# Patient Record
Sex: MALE | Race: WHITE | NOT HISPANIC OR LATINO | Employment: OTHER | URBAN - METROPOLITAN AREA
[De-identification: names, ages, dates, MRNs, and addresses within clinical notes are randomized per-mention and may not be internally consistent; named-entity substitution may affect disease eponyms.]

---

## 2023-04-27 LAB
ANION GAP IN SER/PLAS: 12 MMOL/L (ref 10–20)
CALCIUM (MG/DL) IN SER/PLAS: 9 MG/DL (ref 8.6–10.3)
CARBON DIOXIDE, TOTAL (MMOL/L) IN SER/PLAS: 27 MMOL/L (ref 21–32)
CHLORIDE (MMOL/L) IN SER/PLAS: 104 MMOL/L (ref 98–107)
CREATININE (MG/DL) IN SER/PLAS: 0.9 MG/DL (ref 0.5–1.3)
GFR MALE: >90 ML/MIN/1.73M2
GLUCOSE (MG/DL) IN SER/PLAS: 110 MG/DL (ref 74–99)
POTASSIUM (MMOL/L) IN SER/PLAS: 3.9 MMOL/L (ref 3.5–5.3)
SODIUM (MMOL/L) IN SER/PLAS: 139 MMOL/L (ref 136–145)
UREA NITROGEN (MG/DL) IN SER/PLAS: 19 MG/DL (ref 6–23)

## 2023-10-02 ENCOUNTER — TELEPHONE (OUTPATIENT)
Dept: PRIMARY CARE | Facility: CLINIC | Age: 68
End: 2023-10-02
Payer: MEDICARE

## 2023-10-02 DIAGNOSIS — Z12.11 ENCOUNTER FOR SCREENING FOR MALIGNANT NEOPLASM OF COLON: Primary | ICD-10-CM

## 2023-10-17 ENCOUNTER — OFFICE VISIT (OUTPATIENT)
Dept: PRIMARY CARE | Facility: CLINIC | Age: 68
End: 2023-10-17
Payer: MEDICARE

## 2023-10-17 VITALS
OXYGEN SATURATION: 95 % | TEMPERATURE: 98 F | HEART RATE: 66 BPM | BODY MASS INDEX: 32.26 KG/M2 | SYSTOLIC BLOOD PRESSURE: 140 MMHG | HEIGHT: 72 IN | WEIGHT: 238.2 LBS | DIASTOLIC BLOOD PRESSURE: 82 MMHG

## 2023-10-17 DIAGNOSIS — N40.0 BENIGN PROSTATIC HYPERPLASIA WITHOUT LOWER URINARY TRACT SYMPTOMS: ICD-10-CM

## 2023-10-17 DIAGNOSIS — Z00.00 MEDICARE ANNUAL WELLNESS VISIT, SUBSEQUENT: Primary | ICD-10-CM

## 2023-10-17 DIAGNOSIS — E78.5 HYPERLIPIDEMIA, UNSPECIFIED HYPERLIPIDEMIA TYPE: ICD-10-CM

## 2023-10-17 DIAGNOSIS — N52.9 ERECTILE DYSFUNCTION, UNSPECIFIED ERECTILE DYSFUNCTION TYPE: ICD-10-CM

## 2023-10-17 LAB
ALBUMIN SERPL BCP-MCNC: 4.3 G/DL (ref 3.4–5)
ALP SERPL-CCNC: 72 U/L (ref 33–136)
ALT SERPL W P-5'-P-CCNC: 22 U/L (ref 10–52)
ANION GAP SERPL CALC-SCNC: 15 MMOL/L (ref 10–20)
AST SERPL W P-5'-P-CCNC: 18 U/L (ref 9–39)
BASOPHILS # BLD AUTO: 0.04 X10*3/UL (ref 0–0.1)
BASOPHILS NFR BLD AUTO: 0.5 %
BILIRUB SERPL-MCNC: 0.6 MG/DL (ref 0–1.2)
BUN SERPL-MCNC: 19 MG/DL (ref 6–23)
CALCIUM SERPL-MCNC: 9.1 MG/DL (ref 8.6–10.6)
CHLORIDE SERPL-SCNC: 104 MMOL/L (ref 98–107)
CHOLEST SERPL-MCNC: 136 MG/DL (ref 0–199)
CHOLESTEROL/HDL RATIO: 4.6
CO2 SERPL-SCNC: 26 MMOL/L (ref 21–32)
CREAT SERPL-MCNC: 0.91 MG/DL (ref 0.5–1.3)
EOSINOPHIL # BLD AUTO: 0.14 X10*3/UL (ref 0–0.7)
EOSINOPHIL NFR BLD AUTO: 1.8 %
ERYTHROCYTE [DISTWIDTH] IN BLOOD BY AUTOMATED COUNT: 12.6 % (ref 11.5–14.5)
GFR SERPL CREATININE-BSD FRML MDRD: >90 ML/MIN/1.73M*2
GLUCOSE SERPL-MCNC: 102 MG/DL (ref 74–99)
HCT VFR BLD AUTO: 47.3 % (ref 41–52)
HCV AB SER QL: NONREACTIVE
HDLC SERPL-MCNC: 29.5 MG/DL
HGB BLD-MCNC: 16 G/DL (ref 13.5–17.5)
IMM GRANULOCYTES # BLD AUTO: 0.05 X10*3/UL (ref 0–0.7)
IMM GRANULOCYTES NFR BLD AUTO: 0.6 % (ref 0–0.9)
LDLC SERPL CALC-MCNC: 57 MG/DL
LYMPHOCYTES # BLD AUTO: 1.88 X10*3/UL (ref 1.2–4.8)
LYMPHOCYTES NFR BLD AUTO: 23.7 %
MCH RBC QN AUTO: 32.6 PG (ref 26–34)
MCHC RBC AUTO-ENTMCNC: 33.8 G/DL (ref 32–36)
MCV RBC AUTO: 96 FL (ref 80–100)
MONOCYTES # BLD AUTO: 0.76 X10*3/UL (ref 0.1–1)
MONOCYTES NFR BLD AUTO: 9.6 %
NEUTROPHILS # BLD AUTO: 5.05 X10*3/UL (ref 1.2–7.7)
NEUTROPHILS NFR BLD AUTO: 63.8 %
NON HDL CHOLESTEROL: 107 MG/DL (ref 0–149)
NRBC BLD-RTO: 0 /100 WBCS (ref 0–0)
PLATELET # BLD AUTO: 176 X10*3/UL (ref 150–450)
PMV BLD AUTO: 10.9 FL (ref 7.5–11.5)
POTASSIUM SERPL-SCNC: 3.6 MMOL/L (ref 3.5–5.3)
PROT SERPL-MCNC: 7.7 G/DL (ref 6.4–8.2)
PSA SERPL-MCNC: 0.51 NG/ML
RBC # BLD AUTO: 4.91 X10*6/UL (ref 4.5–5.9)
SODIUM SERPL-SCNC: 141 MMOL/L (ref 136–145)
TRIGL SERPL-MCNC: 250 MG/DL (ref 0–149)
VLDL: 50 MG/DL (ref 0–40)
WBC # BLD AUTO: 7.9 X10*3/UL (ref 4.4–11.3)

## 2023-10-17 PROCEDURE — 99497 ADVNCD CARE PLAN 30 MIN: CPT | Performed by: FAMILY MEDICINE

## 2023-10-17 PROCEDURE — 80053 COMPREHEN METABOLIC PANEL: CPT

## 2023-10-17 PROCEDURE — 3008F BODY MASS INDEX DOCD: CPT | Performed by: FAMILY MEDICINE

## 2023-10-17 PROCEDURE — 36415 COLL VENOUS BLD VENIPUNCTURE: CPT

## 2023-10-17 PROCEDURE — 1159F MED LIST DOCD IN RCRD: CPT | Performed by: FAMILY MEDICINE

## 2023-10-17 PROCEDURE — 1036F TOBACCO NON-USER: CPT | Performed by: FAMILY MEDICINE

## 2023-10-17 PROCEDURE — 99214 OFFICE O/P EST MOD 30 MIN: CPT | Performed by: FAMILY MEDICINE

## 2023-10-17 PROCEDURE — 80061 LIPID PANEL: CPT

## 2023-10-17 PROCEDURE — 84153 ASSAY OF PSA TOTAL: CPT

## 2023-10-17 PROCEDURE — 85025 COMPLETE CBC W/AUTO DIFF WBC: CPT

## 2023-10-17 PROCEDURE — 1170F FXNL STATUS ASSESSED: CPT | Performed by: FAMILY MEDICINE

## 2023-10-17 PROCEDURE — 1126F AMNT PAIN NOTED NONE PRSNT: CPT | Performed by: FAMILY MEDICINE

## 2023-10-17 PROCEDURE — 1160F RVW MEDS BY RX/DR IN RCRD: CPT | Performed by: FAMILY MEDICINE

## 2023-10-17 PROCEDURE — 86803 HEPATITIS C AB TEST: CPT

## 2023-10-17 PROCEDURE — 1158F ADVNC CARE PLAN TLK DOCD: CPT | Performed by: FAMILY MEDICINE

## 2023-10-17 PROCEDURE — G0439 PPPS, SUBSEQ VISIT: HCPCS | Performed by: FAMILY MEDICINE

## 2023-10-17 RX ORDER — DOCUSATE SODIUM 100 MG/1
CAPSULE, LIQUID FILLED ORAL 2 TIMES DAILY PRN
COMMUNITY
Start: 2021-09-09

## 2023-10-17 RX ORDER — HYDROCODONE BITARTRATE AND ACETAMINOPHEN 5; 325 MG/1; MG/1
1 TABLET ORAL EVERY 6 HOURS PRN
COMMUNITY
Start: 2022-11-10 | End: 2023-10-17 | Stop reason: ALTCHOICE

## 2023-10-17 RX ORDER — HYDROCHLOROTHIAZIDE 25 MG/1
25 TABLET ORAL DAILY
COMMUNITY
End: 2024-02-22 | Stop reason: SDUPTHER

## 2023-10-17 RX ORDER — IRON POLYSACCHARIDE COMPLEX 150 MG
CAPSULE ORAL
COMMUNITY
Start: 2021-09-09 | End: 2023-10-17 | Stop reason: ALTCHOICE

## 2023-10-17 RX ORDER — AMOXICILLIN 500 MG/1
CAPSULE ORAL
COMMUNITY
Start: 2022-11-28 | End: 2023-10-17 | Stop reason: ALTCHOICE

## 2023-10-17 RX ORDER — FLUTICASONE PROPIONATE 50 MCG
2 SPRAY, SUSPENSION (ML) NASAL DAILY
COMMUNITY
Start: 2023-05-13

## 2023-10-17 RX ORDER — IBUPROFEN 800 MG/1
TABLET ORAL
COMMUNITY
Start: 2022-12-06 | End: 2023-10-17 | Stop reason: ALTCHOICE

## 2023-10-17 RX ORDER — METOPROLOL TARTRATE 50 MG/1
TABLET ORAL
COMMUNITY
End: 2023-11-05

## 2023-10-17 RX ORDER — OXCARBAZEPINE 600 MG/1
600 TABLET, FILM COATED ORAL 2 TIMES DAILY
COMMUNITY
End: 2023-11-20 | Stop reason: SDUPTHER

## 2023-10-17 RX ORDER — ACETAMINOPHEN 325 MG/1
TABLET ORAL EVERY 6 HOURS PRN
COMMUNITY
Start: 2021-08-28

## 2023-10-17 RX ORDER — AMOXICILLIN 875 MG/1
1 TABLET, FILM COATED ORAL EVERY 12 HOURS
COMMUNITY
Start: 2023-05-13 | End: 2023-10-17 | Stop reason: ALTCHOICE

## 2023-10-17 RX ORDER — CHLORHEXIDINE GLUCONATE ORAL RINSE 1.2 MG/ML
SOLUTION DENTAL
COMMUNITY
Start: 2022-11-10

## 2023-10-17 RX ORDER — SILDENAFIL 100 MG/1
100 TABLET, FILM COATED ORAL DAILY PRN
Qty: 10 TABLET | Refills: 3 | Status: SHIPPED | OUTPATIENT
Start: 2023-10-17

## 2023-10-17 RX ORDER — POLYETHYLENE GLYCOL 3350 17 G/17G
17 POWDER, FOR SOLUTION ORAL
COMMUNITY
Start: 2021-08-25

## 2023-10-17 RX ORDER — BENZONATATE 100 MG/1
1 CAPSULE ORAL 3 TIMES DAILY PRN
COMMUNITY
Start: 2023-05-13 | End: 2023-10-17 | Stop reason: ALTCHOICE

## 2023-10-17 RX ORDER — SILDENAFIL 100 MG/1
1 TABLET, FILM COATED ORAL DAILY PRN
COMMUNITY
Start: 2020-02-21 | End: 2023-10-17 | Stop reason: SDUPTHER

## 2023-10-17 RX ORDER — ASPIRIN 81 MG/1
1 TABLET ORAL DAILY
COMMUNITY
Start: 2021-08-25

## 2023-10-17 RX ORDER — PROBENECID 500 MG/1
TABLET, FILM COATED ORAL 2 TIMES DAILY
COMMUNITY
Start: 2021-09-04 | End: 2023-10-17 | Stop reason: ALTCHOICE

## 2023-10-17 RX ORDER — ATORVASTATIN CALCIUM 10 MG/1
10 TABLET, FILM COATED ORAL DAILY
COMMUNITY
End: 2023-12-18 | Stop reason: SDUPTHER

## 2023-10-17 SDOH — ECONOMIC STABILITY: TRANSPORTATION INSECURITY
IN THE PAST 12 MONTHS, HAS LACK OF TRANSPORTATION KEPT YOU FROM MEETINGS, WORK, OR FROM GETTING THINGS NEEDED FOR DAILY LIVING?: NO

## 2023-10-17 SDOH — ECONOMIC STABILITY: HOUSING INSECURITY
IN THE LAST 12 MONTHS, WAS THERE A TIME WHEN YOU DID NOT HAVE A STEADY PLACE TO SLEEP OR SLEPT IN A SHELTER (INCLUDING NOW)?: NO

## 2023-10-17 SDOH — ECONOMIC STABILITY: FOOD INSECURITY: WITHIN THE PAST 12 MONTHS, YOU WORRIED THAT YOUR FOOD WOULD RUN OUT BEFORE YOU GOT MONEY TO BUY MORE.: NEVER TRUE

## 2023-10-17 SDOH — ECONOMIC STABILITY: TRANSPORTATION INSECURITY
IN THE PAST 12 MONTHS, HAS THE LACK OF TRANSPORTATION KEPT YOU FROM MEDICAL APPOINTMENTS OR FROM GETTING MEDICATIONS?: NO

## 2023-10-17 SDOH — ECONOMIC STABILITY: FOOD INSECURITY: WITHIN THE PAST 12 MONTHS, THE FOOD YOU BOUGHT JUST DIDN'T LAST AND YOU DIDN'T HAVE MONEY TO GET MORE.: NEVER TRUE

## 2023-10-17 SDOH — ECONOMIC STABILITY: INCOME INSECURITY: IN THE LAST 12 MONTHS, WAS THERE A TIME WHEN YOU WERE NOT ABLE TO PAY THE MORTGAGE OR RENT ON TIME?: NO

## 2023-10-17 ASSESSMENT — ENCOUNTER SYMPTOMS
LOSS OF SENSATION IN FEET: 0
RESPIRATORY NEGATIVE: 1
CONSTITUTIONAL NEGATIVE: 1
ENDOCRINE NEGATIVE: 1
PSYCHIATRIC NEGATIVE: 1
SEIZURES: 1
OCCASIONAL FEELINGS OF UNSTEADINESS: 0
DEPRESSION: 0

## 2023-10-17 ASSESSMENT — ACTIVITIES OF DAILY LIVING (ADL)
STIL DRIVING: YES
GROOMING: INDEPENDENT
NEEDS ASSISTANCE WITH FOOD: INDEPENDENT
JUDGMENT_ADEQUATE_SAFELY_COMPLETE_DAILY_ACTIVITIES: YES
TOILETING: INDEPENDENT
USING TRANSPORTATION: INDEPENDENT
GROCERY SHOPPING: INDEPENDENT
BATHING: INDEPENDENT
PREPARING MEALS: INDEPENDENT
PATIENT'S MEMORY ADEQUATE TO SAFELY COMPLETE DAILY ACTIVITIES?: YES
FEEDING YOURSELF: INDEPENDENT
DOING HOUSEWORK: INDEPENDENT
DRESSING YOURSELF: INDEPENDENT
MANAGING FINANCES: INDEPENDENT
TAKING MEDICATION: INDEPENDENT
BATHING: INDEPENDENT
ADEQUATE_TO_COMPLETE_ADL: YES
DRESSING: INDEPENDENT
TOILETING: INDEPENDENT
USING TELEPHONE: INDEPENDENT
WALKS IN HOME: INDEPENDENT
EATING: INDEPENDENT
FEEDING: INDEPENDENT
JUDGMENT_ADEQUATE_SAFELY_COMPLETE_DAILY_ACTIVITIES: YES
ADEQUATE_TO_COMPLETE_ADL: YES

## 2023-10-17 ASSESSMENT — GERIATRIC MINI NUTRITIONAL ASSESSMENT (MNA)
C GENERAL MOBILITY: GOES OUT
E NEUROPSYCHOLOGICAL PROBLEMS: NO PSYCHOLOGICAL PROBLEMS
D HAS SUFFERED PSYCHOLOGICAL STRESS OR ACUTE DISEASE IN THE PAST 3 MONTHS?: NO
B WEIGHT LOSS DURING THE LAST 3 MONTHS: NO WEIGHT LOSS
A HAS FOOD INTAKE DECLINED OVER THE PAST 3 MONTHS DUE TO LOSS OF APPETITE, DIGESTIVE PROBLEMS, CHEWING OR SWALLOWING DIFFICULTIES?: NO DECREASE IN FOOD INTAKE

## 2023-10-17 ASSESSMENT — ANXIETY QUESTIONNAIRES
2. NOT BEING ABLE TO STOP OR CONTROL WORRYING: NOT AT ALL
1. FEELING NERVOUS, ANXIOUS, OR ON EDGE: NOT AT ALL
4. TROUBLE RELAXING: NOT AT ALL
7. FEELING AFRAID AS IF SOMETHING AWFUL MIGHT HAPPEN: NOT AT ALL
3. WORRYING TOO MUCH ABOUT DIFFERENT THINGS: NOT AT ALL
5. BEING SO RESTLESS THAT IT IS HARD TO SIT STILL: NOT AT ALL
GAD7 TOTAL SCORE: 0
6. BECOMING EASILY ANNOYED OR IRRITABLE: NOT AT ALL

## 2023-10-17 ASSESSMENT — SOCIAL DETERMINANTS OF HEALTH (SDOH)
IN THE PAST 12 MONTHS, HAS THE ELECTRIC, GAS, OIL, OR WATER COMPANY THREATENED TO SHUT OFF SERVICE IN YOUR HOME?: NO
WITHIN THE LAST YEAR, HAVE TO BEEN RAPED OR FORCED TO HAVE ANY KIND OF SEXUAL ACTIVITY BY YOUR PARTNER OR EX-PARTNER?: NO
WITHIN THE LAST YEAR, HAVE YOU BEEN KICKED, HIT, SLAPPED, OR OTHERWISE PHYSICALLY HURT BY YOUR PARTNER OR EX-PARTNER?: NO
WITHIN THE LAST YEAR, HAVE YOU BEEN HUMILIATED OR EMOTIONALLY ABUSED IN OTHER WAYS BY YOUR PARTNER OR EX-PARTNER?: NO
WITHIN THE LAST YEAR, HAVE YOU BEEN AFRAID OF YOUR PARTNER OR EX-PARTNER?: NO
HOW HARD IS IT FOR YOU TO PAY FOR THE VERY BASICS LIKE FOOD, HOUSING, MEDICAL CARE, AND HEATING?: NOT HARD AT ALL

## 2023-10-17 ASSESSMENT — PATIENT HEALTH QUESTIONNAIRE - PHQ9
SUM OF ALL RESPONSES TO PHQ9 QUESTIONS 1 & 2: 0
1. LITTLE INTEREST OR PLEASURE IN DOING THINGS: NOT AT ALL
2. FEELING DOWN, DEPRESSED OR HOPELESS: NOT AT ALL

## 2023-10-17 ASSESSMENT — PAIN SCALES - GENERAL: PAINLEVEL: 0-NO PAIN

## 2023-10-17 ASSESSMENT — LIFESTYLE VARIABLES
SKIP TO QUESTIONS 9-10: 1
HOW OFTEN DO YOU HAVE SIX OR MORE DRINKS ON ONE OCCASION: NEVER
AUDIT-C TOTAL SCORE: 0
HOW MANY STANDARD DRINKS CONTAINING ALCOHOL DO YOU HAVE ON A TYPICAL DAY: PATIENT DOES NOT DRINK
HOW OFTEN DO YOU HAVE A DRINK CONTAINING ALCOHOL: NEVER

## 2023-10-17 NOTE — PROGRESS NOTES
Subjective   Patient ID: Jean Claude Viramontes is a 67 y.o. male who presents for Annual Exam (Assessment annual medicare wellness).    HPI     Review of Systems   Constitutional: Negative.    HENT: Negative.     Respiratory: Negative.     Cardiovascular:         Dr Conley CABG 2 yrs ago   Gastrointestinal:         Having visit Dr Redd to schedule colonoscopy   Endocrine: Negative.    Genitourinary: Negative.    Neurological:  Positive for seizures.   Psychiatric/Behavioral: Negative.         Objective   /82 (BP Location: Right arm)   Pulse 66   Temp 36.7 °C (98 °F) (Temporal)   Ht 1.829 m (6')   Wt 108 kg (238 lb 3.2 oz)   SpO2 95%   BMI 32.31 kg/m²     Physical Exam  Vitals and nursing note reviewed.   HENT:      Right Ear: Tympanic membrane normal.      Left Ear: Tympanic membrane normal.      Mouth/Throat:      Pharynx: Oropharynx is clear.   Cardiovascular:      Rate and Rhythm: Normal rate and regular rhythm.      Pulses: Normal pulses.      Heart sounds: Murmur heard.      Systolic murmur is present with a grade of 2/6.   Pulmonary:      Breath sounds: Normal breath sounds.   Abdominal:      Palpations: Abdomen is soft.   Musculoskeletal:         General: Normal range of motion.   Neurological:      General: No focal deficit present.      Mental Status: He is alert and oriented to person, place, and time.   Psychiatric:         Mood and Affect: Mood normal.         Behavior: Behavior normal.         Assessment/Plan patient seen here for an annual Medicare wellness exam.  We reviewed his questionnaire he is agreeable to his responses.  We did discuss advanced directives.  He has no significant difficulty with depression or anxiety.  He continues to follow with cardiology.  He has had no seizures in 25 years.  He does continue to take his medications.  He has upcoming appoint with gastroenterology to arrange a colonoscopy.  I will see him back in a year  Problem List Items Addressed This Visit     None  Visit Diagnoses         Codes    Medicare annual wellness visit, subsequent    -  Primary Z00.00    Relevant Orders    Hepatitis C antibody    Benign prostatic hyperplasia without lower urinary tract symptoms     N40.0    Relevant Orders    Prostate Specific Antigen    BMI 32.0-32.9,adult     Z68.32    Erectile dysfunction, unspecified erectile dysfunction type     N52.9    Relevant Medications    sildenafil (Viagra) 100 mg tablet    Hyperlipidemia, unspecified hyperlipidemia type     E78.5    Relevant Orders    Lipid Panel    CBC and Auto Differential    Comprehensive Metabolic Panel

## 2023-10-17 NOTE — ACP (ADVANCE CARE PLANNING)
Confirming Previous Code Status:   Advance Care Planning Note     Discussion Date: 10/17/23   Discussion Participants: patient    The patient wishes to discuss Advance Care Planning today and the following is a brief summary of our discussion.     Patient has capacity to make their own medical decisions: Yes  Health Care Agent/Surrogate Decision Maker documented in chart: Yes    Documents on file and valid:  Advance Directive/Living Will: Yes   Health Care Power of : Yes  Other: none    Communication of Medical Status/Prognosis:   yes     Communication of Treatment Goals/Options:   no     Treatment Decisions  Goals of Care: survival is paramount regardless of prognosis, treatment outcome, or burden   yes  Follow Up Plan  no  Team Members  myself  Time Statement: Total face to face time spent on advance care planning was 16 minutes with 16 minutes spent in counseling, including the explanation.    Mingo Crook DO  10/17/2023 4:46 PM

## 2023-11-03 DIAGNOSIS — I48.91 ATRIAL FIBRILLATION, UNSPECIFIED TYPE (MULTI): Primary | ICD-10-CM

## 2023-11-05 RX ORDER — METOPROLOL TARTRATE 50 MG/1
TABLET ORAL
Qty: 120 TABLET | Refills: 0 | Status: SHIPPED | OUTPATIENT
Start: 2023-11-05 | End: 2024-01-02 | Stop reason: SDUPTHER

## 2023-11-20 DIAGNOSIS — R56.9 SEIZURE (MULTI): Primary | ICD-10-CM

## 2023-11-20 RX ORDER — OXCARBAZEPINE 600 MG/1
600 TABLET, FILM COATED ORAL 2 TIMES DAILY
Qty: 180 TABLET | Refills: 1 | Status: SHIPPED | OUTPATIENT
Start: 2023-11-20 | End: 2024-05-22

## 2023-11-21 ENCOUNTER — APPOINTMENT (OUTPATIENT)
Dept: GASTROENTEROLOGY | Facility: CLINIC | Age: 68
End: 2023-11-21
Payer: MEDICARE

## 2023-11-27 ENCOUNTER — OFFICE VISIT (OUTPATIENT)
Dept: GASTROENTEROLOGY | Facility: CLINIC | Age: 68
End: 2023-11-27
Payer: MEDICARE

## 2023-11-27 ENCOUNTER — TELEPHONE (OUTPATIENT)
Dept: CARDIOLOGY | Facility: CLINIC | Age: 68
End: 2023-11-27

## 2023-11-27 ENCOUNTER — TELEPHONE (OUTPATIENT)
Dept: GASTROENTEROLOGY | Facility: CLINIC | Age: 68
End: 2023-11-27

## 2023-11-27 VITALS
HEART RATE: 58 BPM | HEIGHT: 72 IN | WEIGHT: 242 LBS | DIASTOLIC BLOOD PRESSURE: 85 MMHG | SYSTOLIC BLOOD PRESSURE: 169 MMHG | BODY MASS INDEX: 32.78 KG/M2

## 2023-11-27 DIAGNOSIS — Z12.11 COLON CANCER SCREENING: Primary | ICD-10-CM

## 2023-11-27 DIAGNOSIS — K92.1 HEMATOCHEZIA: ICD-10-CM

## 2023-11-27 DIAGNOSIS — Z95.3 S/P AORTIC VALVE REPLACEMENT WITH BIOPROSTHETIC VALVE: ICD-10-CM

## 2023-11-27 PROCEDURE — 46600 DIAGNOSTIC ANOSCOPY SPX: CPT | Performed by: STUDENT IN AN ORGANIZED HEALTH CARE EDUCATION/TRAINING PROGRAM

## 2023-11-27 PROCEDURE — 1159F MED LIST DOCD IN RCRD: CPT | Performed by: STUDENT IN AN ORGANIZED HEALTH CARE EDUCATION/TRAINING PROGRAM

## 2023-11-27 PROCEDURE — 3008F BODY MASS INDEX DOCD: CPT | Performed by: STUDENT IN AN ORGANIZED HEALTH CARE EDUCATION/TRAINING PROGRAM

## 2023-11-27 PROCEDURE — 1036F TOBACCO NON-USER: CPT | Performed by: STUDENT IN AN ORGANIZED HEALTH CARE EDUCATION/TRAINING PROGRAM

## 2023-11-27 PROCEDURE — 1160F RVW MEDS BY RX/DR IN RCRD: CPT | Performed by: STUDENT IN AN ORGANIZED HEALTH CARE EDUCATION/TRAINING PROGRAM

## 2023-11-27 PROCEDURE — 1126F AMNT PAIN NOTED NONE PRSNT: CPT | Performed by: STUDENT IN AN ORGANIZED HEALTH CARE EDUCATION/TRAINING PROGRAM

## 2023-11-27 PROCEDURE — 1158F ADVNC CARE PLAN TLK DOCD: CPT | Performed by: STUDENT IN AN ORGANIZED HEALTH CARE EDUCATION/TRAINING PROGRAM

## 2023-11-27 PROCEDURE — 99204 OFFICE O/P NEW MOD 45 MIN: CPT | Performed by: STUDENT IN AN ORGANIZED HEALTH CARE EDUCATION/TRAINING PROGRAM

## 2023-11-27 RX ORDER — SODIUM, POTASSIUM,MAG SULFATES 17.5-3.13G
1 SOLUTION, RECONSTITUTED, ORAL ORAL 2 TIMES DAILY
Qty: 354 ML | Refills: 0 | Status: SHIPPED | OUTPATIENT
Start: 2023-11-27

## 2023-11-27 RX ORDER — SODIUM CHLORIDE 0.9 % (FLUSH) 0.9 %
10 SYRINGE (ML) INJECTION AS NEEDED
OUTPATIENT
Start: 2023-11-27

## 2023-11-27 RX ORDER — SODIUM CHLORIDE, SODIUM LACTATE, POTASSIUM CHLORIDE, CALCIUM CHLORIDE 600; 310; 30; 20 MG/100ML; MG/100ML; MG/100ML; MG/100ML
20 INJECTION, SOLUTION INTRAVENOUS CONTINUOUS
OUTPATIENT
Start: 2023-11-27

## 2023-11-27 RX ORDER — SODIUM CHLORIDE 0.9 % (FLUSH) 0.9 %
10 SYRINGE (ML) INJECTION EVERY 12 HOURS
OUTPATIENT
Start: 2023-11-27

## 2023-11-27 RX ORDER — HYDROCORTISONE 25 MG/G
CREAM TOPICAL 2 TIMES DAILY
Qty: 30 G | Refills: 6 | Status: SHIPPED | OUTPATIENT
Start: 2023-11-27 | End: 2023-12-07

## 2023-11-27 RX ORDER — AMOXICILLIN 500 MG/1
CAPSULE ORAL
Qty: 12 CAPSULE | Refills: 11 | Status: SHIPPED | OUTPATIENT
Start: 2023-11-27 | End: 2023-12-07

## 2023-11-27 NOTE — PROGRESS NOTES
68-year-old gentleman with history of CAD/CABG few years ago, dyslipidemia BPH pig aortic valve replacement on aspirin, history of seizure posttrauma many years ago currently resolved presenting for chronic straining during bowel movements for 1 year.  Patient denies having colonoscopy in the past.    EGD never  Colonoscopy never  Daughter has colon polyps  1 bowel movement per day  Denies NSAIDs alcohol marijuana drug use, ex-smoker             The note was created using voice recognition transcription software. Despite proofreading, unintentional typographical errors may be present. Please contact the GI office with any questions or concerns.     Current Medications: reviewed    A 10 point review of system is negative except for what is mentioned in the HPI    Follow up with GI was advised       Vital Signs: Reviewed    Physical Exam:  General: no apparent distress, pleasant and cooperative  Skin:  Warm and dry, no jaundice  HEENT: No scleral icterus, no conjunctival pallor, normocephalic, atraumatic, mucous membranes moist  Neck:  atraumatic, trachea midline, no JVD  Chest:  decreased air entry to auscultation bilaterally. No wheezes, rales, or rhonchi  CV: Positive heart murmur, regular rate and rhythm.  Positive S1/S2  Abdomen: no distension, +BS, soft, non-tender to palpation, no rebound tenderness, no guarding, no rigidity, no discernible ascites   Extremities: no lower extremity edema, Chronic pigmentary changes, no cyanosis  Neurological:  A&Ox3 , no asterixis  Psychiatric: cooperative     Investigations:  Labs, radiological imaging and cardiac work up were reviewed    1-hepatitis C antibody negative in 10/2023    2-BMI 32, healthy lifestyle advised    3-Healthcare maintenance, will schedule colonoscopy    4-intermittent straining during bowel movements with intermittent small amount of blood on the toilet paper especially with straining, start Metamucil daily, rectal exam and anoscopy as below showing  small to medium external and internal hemorrhoids, skin tags, see report, sitz bath and Anusol suppository twice a day for 10 days, repeat as needed, will follow colonoscopy      Patient ID: Jean Claude Viramontes is a 68 y.o. male.    Anoscopy    Date/Time: 11/27/2023 8:19 AM    Performed by: Gene Bansal MD  Authorized by: Gene Bansal MD    Consent:     Consent obtained:  Verbal    Risks discussed:  Bleeding, infection and pain  Universal protocol:     Procedure explained and questions answered to patient or proxy's satisfaction: yes      Relevant documents present and verified: yes      Test results available: yes      Imaging studies available: yes      Required blood products, implants, devices, and special equipment available: yes    Indications:     Indications:  Anal discomfort,'s blood in the stools  Procedure specific details:      Small to medium external and internal hemorrhoids, skin tags, exam limited by patient's discomfort  Post-procedure details:     Procedure completion:  Tolerated well, no immediate complications

## 2023-11-27 NOTE — TELEPHONE ENCOUNTER
Patient called stating he is having a tooth extraction on 12/1/23 and is asking for antibiotic prior to procedure. Hx s/p avr bioprosthetic valve. Patient last OV 10/2022.

## 2023-12-18 DIAGNOSIS — Z71.89 CARDIAC RISK COUNSELING: Primary | ICD-10-CM

## 2023-12-18 RX ORDER — ATORVASTATIN CALCIUM 10 MG/1
10 TABLET, FILM COATED ORAL DAILY
Qty: 30 TABLET | Refills: 0 | Status: SHIPPED | OUTPATIENT
Start: 2023-12-18 | End: 2024-01-15 | Stop reason: SDUPTHER

## 2023-12-21 ENCOUNTER — APPOINTMENT (OUTPATIENT)
Dept: GASTROENTEROLOGY | Facility: CLINIC | Age: 68
End: 2023-12-21
Payer: MEDICARE

## 2024-01-02 DIAGNOSIS — I48.91 ATRIAL FIBRILLATION, UNSPECIFIED TYPE (MULTI): ICD-10-CM

## 2024-01-02 RX ORDER — METOPROLOL TARTRATE 50 MG/1
TABLET ORAL
Qty: 180 TABLET | Refills: 0 | Status: SHIPPED | OUTPATIENT
Start: 2024-01-02 | End: 2024-04-10

## 2024-01-13 DIAGNOSIS — Z71.89 CARDIAC RISK COUNSELING: ICD-10-CM

## 2024-01-15 RX ORDER — ATORVASTATIN CALCIUM 10 MG/1
TABLET, FILM COATED ORAL
Qty: 90 TABLET | Refills: 3 | Status: SHIPPED | OUTPATIENT
Start: 2024-01-15

## 2024-01-17 ENCOUNTER — TELEPHONE (OUTPATIENT)
Dept: GASTROENTEROLOGY | Facility: CLINIC | Age: 69
End: 2024-01-17
Payer: MEDICARE

## 2024-02-22 DIAGNOSIS — I10 HYPERTENSION, UNSPECIFIED TYPE: ICD-10-CM

## 2024-02-22 RX ORDER — HYDROCHLOROTHIAZIDE 25 MG/1
25 TABLET ORAL DAILY
Qty: 90 TABLET | Refills: 0 | Status: SHIPPED | OUTPATIENT
Start: 2024-02-22 | End: 2024-02-27 | Stop reason: SDUPTHER

## 2024-02-27 DIAGNOSIS — I10 HYPERTENSION, UNSPECIFIED TYPE: ICD-10-CM

## 2024-02-27 RX ORDER — HYDROCHLOROTHIAZIDE 25 MG/1
25 TABLET ORAL DAILY
Qty: 90 TABLET | Refills: 3 | Status: SHIPPED | OUTPATIENT
Start: 2024-02-27

## 2024-04-09 DIAGNOSIS — I48.91 ATRIAL FIBRILLATION, UNSPECIFIED TYPE (MULTI): ICD-10-CM

## 2024-04-10 RX ORDER — METOPROLOL TARTRATE 50 MG/1
TABLET ORAL
Qty: 60 TABLET | Refills: 0 | Status: SHIPPED | OUTPATIENT
Start: 2024-04-10 | End: 2024-05-07

## 2024-04-11 ENCOUNTER — OFFICE VISIT (OUTPATIENT)
Dept: WOUND CARE | Facility: CLINIC | Age: 69
End: 2024-04-11
Payer: MEDICARE

## 2024-04-11 PROCEDURE — 99204 OFFICE O/P NEW MOD 45 MIN: CPT | Performed by: NURSE PRACTITIONER

## 2024-04-11 PROCEDURE — 11042 DBRDMT SUBQ TIS 1ST 20SQCM/<: CPT | Performed by: NURSE PRACTITIONER

## 2024-04-11 PROCEDURE — 99212 OFFICE O/P EST SF 10 MIN: CPT

## 2024-04-11 PROCEDURE — 11042 DBRDMT SUBQ TIS 1ST 20SQCM/<: CPT

## 2024-04-16 ENCOUNTER — OFFICE VISIT (OUTPATIENT)
Dept: WOUND CARE | Facility: CLINIC | Age: 69
End: 2024-04-16
Payer: MEDICARE

## 2024-04-16 PROCEDURE — 11721 DEBRIDE NAIL 6 OR MORE: CPT | Mod: 59

## 2024-04-16 PROCEDURE — 11042 DBRDMT SUBQ TIS 1ST 20SQCM/<: CPT

## 2024-04-18 ENCOUNTER — LAB (OUTPATIENT)
Dept: LAB | Facility: LAB | Age: 69
End: 2024-04-18
Payer: MEDICARE

## 2024-04-18 DIAGNOSIS — R73.01 IMPAIRED FASTING GLUCOSE: Primary | ICD-10-CM

## 2024-04-18 LAB
EST. AVERAGE GLUCOSE BLD GHB EST-MCNC: 151 MG/DL
HBA1C MFR BLD: 6.9 %

## 2024-04-18 PROCEDURE — 83036 HEMOGLOBIN GLYCOSYLATED A1C: CPT

## 2024-04-18 PROCEDURE — 36415 COLL VENOUS BLD VENIPUNCTURE: CPT

## 2024-04-23 ENCOUNTER — OFFICE VISIT (OUTPATIENT)
Dept: WOUND CARE | Facility: CLINIC | Age: 69
End: 2024-04-23
Payer: MEDICARE

## 2024-04-23 DIAGNOSIS — Z95.2 STATUS POST AORTIC VALVE REPLACEMENT: ICD-10-CM

## 2024-04-23 PROCEDURE — 11042 DBRDMT SUBQ TIS 1ST 20SQCM/<: CPT

## 2024-04-25 ENCOUNTER — LAB (OUTPATIENT)
Dept: LAB | Facility: LAB | Age: 69
End: 2024-04-25
Payer: MEDICARE

## 2024-04-25 DIAGNOSIS — Z95.2 STATUS POST AORTIC VALVE REPLACEMENT: ICD-10-CM

## 2024-04-25 LAB
ANION GAP SERPL CALC-SCNC: 12 MMOL/L (ref 10–20)
BUN SERPL-MCNC: 18 MG/DL (ref 6–23)
CALCIUM SERPL-MCNC: 8.8 MG/DL (ref 8.6–10.3)
CHLORIDE SERPL-SCNC: 103 MMOL/L (ref 98–107)
CO2 SERPL-SCNC: 26 MMOL/L (ref 21–32)
CREAT SERPL-MCNC: 0.91 MG/DL (ref 0.5–1.3)
EGFRCR SERPLBLD CKD-EPI 2021: >90 ML/MIN/1.73M*2
GLUCOSE SERPL-MCNC: 163 MG/DL (ref 74–99)
POTASSIUM SERPL-SCNC: 4 MMOL/L (ref 3.5–5.3)
SODIUM SERPL-SCNC: 137 MMOL/L (ref 136–145)

## 2024-04-25 PROCEDURE — 36415 COLL VENOUS BLD VENIPUNCTURE: CPT

## 2024-04-25 PROCEDURE — 80048 BASIC METABOLIC PNL TOTAL CA: CPT

## 2024-04-30 ENCOUNTER — HOSPITAL ENCOUNTER (OUTPATIENT)
Dept: RADIOLOGY | Facility: HOSPITAL | Age: 69
Discharge: HOME | End: 2024-04-30
Payer: MEDICARE

## 2024-04-30 ENCOUNTER — OFFICE VISIT (OUTPATIENT)
Dept: WOUND CARE | Facility: CLINIC | Age: 69
End: 2024-04-30
Payer: MEDICARE

## 2024-04-30 DIAGNOSIS — Z95.828 PRESENCE OF OTHER VASCULAR IMPLANTS AND GRAFTS: ICD-10-CM

## 2024-04-30 PROCEDURE — 71275 CT ANGIOGRAPHY CHEST: CPT | Performed by: RADIOLOGY

## 2024-04-30 PROCEDURE — 74174 CTA ABD&PLVS W/CONTRAST: CPT | Performed by: RADIOLOGY

## 2024-04-30 PROCEDURE — 11042 DBRDMT SUBQ TIS 1ST 20SQCM/<: CPT

## 2024-04-30 PROCEDURE — 2550000001 HC RX 255 CONTRASTS: Performed by: THORACIC SURGERY (CARDIOTHORACIC VASCULAR SURGERY)

## 2024-04-30 PROCEDURE — 71275 CT ANGIOGRAPHY CHEST: CPT

## 2024-04-30 RX ADMIN — IOHEXOL 75 ML: 350 INJECTION, SOLUTION INTRAVENOUS at 10:38

## 2024-05-06 DIAGNOSIS — I48.91 ATRIAL FIBRILLATION, UNSPECIFIED TYPE (MULTI): ICD-10-CM

## 2024-05-07 PROBLEM — I77.810 ASCENDING AORTA DILATATION (CMS-HCC): Status: RESOLVED | Noted: 2024-05-07 | Resolved: 2024-05-07

## 2024-05-07 PROBLEM — K92.1 HEMATOCHEZIA: Status: RESOLVED | Noted: 2024-05-07 | Resolved: 2024-05-07

## 2024-05-07 PROBLEM — R53.83 FATIGUE: Status: RESOLVED | Noted: 2024-05-07 | Resolved: 2024-05-07

## 2024-05-07 PROBLEM — R73.9 BORDERLINE HYPERGLYCEMIA: Status: RESOLVED | Noted: 2024-05-07 | Resolved: 2024-05-07

## 2024-05-07 PROBLEM — R56.9 SEIZURE (MULTI): Status: RESOLVED | Noted: 2024-05-07 | Resolved: 2024-05-07

## 2024-05-07 PROBLEM — G40.909 EPILEPSY (MULTI): Status: RESOLVED | Noted: 2024-05-07 | Resolved: 2024-05-07

## 2024-05-07 PROBLEM — I48.91 ATRIAL FIBRILLATION WITH RAPID VENTRICULAR RESPONSE (MULTI): Status: RESOLVED | Noted: 2024-05-07 | Resolved: 2024-05-07

## 2024-05-07 PROBLEM — I31.39 PERICARDIAL EFFUSION (HHS-HCC): Status: RESOLVED | Noted: 2024-05-07 | Resolved: 2024-05-07

## 2024-05-07 PROBLEM — Z98.890 HISTORY OF MAJOR VASCULAR SURGERY: Status: RESOLVED | Noted: 2024-05-07 | Resolved: 2024-05-07

## 2024-05-07 PROBLEM — J32.9 SINUSITIS: Status: RESOLVED | Noted: 2024-05-07 | Resolved: 2024-05-07

## 2024-05-07 PROBLEM — Z98.890 HISTORY OF OPEN HEART SURGERY: Status: RESOLVED | Noted: 2024-05-07 | Resolved: 2024-05-07

## 2024-05-07 PROBLEM — Z98.890 S/P PERICARDIAL WINDOW CREATION: Status: RESOLVED | Noted: 2024-05-07 | Resolved: 2024-05-07
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
## 2024-05-07 PROBLEM — I31.39 PERICARDIAL EFFUSION (HHS-HCC): Status: ACTIVE | Noted: 2024-05-07

## 2024-05-07 PROBLEM — R06.83 SNORES: Status: RESOLVED | Noted: 2024-05-07 | Resolved: 2024-05-07

## 2024-05-07 PROBLEM — Z98.890: Status: RESOLVED | Noted: 2024-05-07 | Resolved: 2024-05-07

## 2024-05-07 PROBLEM — R06.02 SOB (SHORTNESS OF BREATH) ON EXERTION: Status: RESOLVED | Noted: 2024-05-07 | Resolved: 2024-05-07

## 2024-05-07 PROBLEM — Z95.2 S/P AVR: Status: ACTIVE | Noted: 2024-05-07

## 2024-05-07 PROBLEM — D69.6 THROMBOCYTOPENIA (CMS-HCC): Status: RESOLVED | Noted: 2024-05-07 | Resolved: 2024-05-07

## 2024-05-07 PROBLEM — S82.839A FRACTURE OF DISTAL END OF FIBULA: Status: RESOLVED | Noted: 2024-05-07 | Resolved: 2024-05-07

## 2024-05-07 PROBLEM — I71.20 THORACIC AORTIC ANEURYSM (CMS-HCC): Status: RESOLVED | Noted: 2024-05-07 | Resolved: 2024-05-07

## 2024-05-07 PROBLEM — I10 BENIGN ESSENTIAL HYPERTENSION: Status: RESOLVED | Noted: 2024-05-07 | Resolved: 2024-05-07

## 2024-05-07 PROBLEM — J01.90 ACUTE BACTERIAL SINUSITIS: Status: RESOLVED | Noted: 2024-05-07 | Resolved: 2024-05-07

## 2024-05-07 PROBLEM — I95.9 HYPOTENSION: Status: RESOLVED | Noted: 2024-05-07 | Resolved: 2024-05-07

## 2024-05-07 PROBLEM — I35.0 AORTIC VALVE STENOSIS: Status: RESOLVED | Noted: 2021-08-26 | Resolved: 2024-05-07

## 2024-05-07 PROBLEM — I71.20 THORACIC AORTIC ANEURYSM (TAA) (CMS-HCC): Status: RESOLVED | Noted: 2024-05-07 | Resolved: 2024-05-07

## 2024-05-07 PROBLEM — R07.9 CHEST PAIN: Status: RESOLVED | Noted: 2024-05-07 | Resolved: 2024-05-07

## 2024-05-07 PROBLEM — N52.9 ED (ERECTILE DYSFUNCTION): Status: RESOLVED | Noted: 2024-05-07 | Resolved: 2024-05-07

## 2024-05-07 PROBLEM — D62 POSTOPERATIVE ANEMIA DUE TO ACUTE BLOOD LOSS: Status: RESOLVED | Noted: 2024-05-07 | Resolved: 2024-05-07

## 2024-05-07 PROBLEM — I48.91 A-FIB (MULTI): Status: RESOLVED | Noted: 2024-05-07 | Resolved: 2024-05-07

## 2024-05-07 PROBLEM — R40.0 DAYTIME SOMNOLENCE: Status: RESOLVED | Noted: 2024-05-07 | Resolved: 2024-05-07

## 2024-05-07 PROBLEM — D69.6 AMEGAKARYOCYTIC THROMBOCYTOPENIA (CMS-HCC): Status: RESOLVED | Noted: 2024-05-07 | Resolved: 2024-05-07

## 2024-05-07 PROBLEM — B96.89 ACUTE BACTERIAL SINUSITIS: Status: RESOLVED | Noted: 2024-05-07 | Resolved: 2024-05-07

## 2024-05-07 PROBLEM — Z95.3 S/P AORTIC VALVE REPLACEMENT WITH BIOPROSTHETIC VALVE: Status: RESOLVED | Noted: 2024-05-07 | Resolved: 2024-05-07

## 2024-05-07 PROBLEM — G89.18 ACUTE POSTOPERATIVE PAIN: Status: RESOLVED | Noted: 2024-05-07 | Resolved: 2024-05-07

## 2024-05-07 PROBLEM — Z95.828 S/P ASCENDING AORTIC REPLACEMENT: Status: RESOLVED | Noted: 2024-05-07 | Resolved: 2024-05-07

## 2024-05-07 PROBLEM — R73.9 HYPERGLYCEMIA: Status: RESOLVED | Noted: 2024-04-18 | Resolved: 2024-05-07

## 2024-05-07 PROBLEM — J02.9 SORE THROAT: Status: RESOLVED | Noted: 2024-05-07 | Resolved: 2024-05-07

## 2024-05-07 PROBLEM — Z86.69 HISTORY OF SEIZURE DISORDER: Status: RESOLVED | Noted: 2024-05-07 | Resolved: 2024-05-07

## 2024-05-07 PROBLEM — R06.02 SHORTNESS OF BREATH: Status: RESOLVED | Noted: 2024-05-07 | Resolved: 2024-05-07

## 2024-05-07 PROBLEM — R06.83 SNORING: Status: RESOLVED | Noted: 2024-05-07 | Resolved: 2024-05-07

## 2024-05-07 PROBLEM — D64.9 ANEMIA: Status: RESOLVED | Noted: 2024-05-07 | Resolved: 2024-05-07

## 2024-05-07 RX ORDER — METOPROLOL TARTRATE 50 MG/1
50 TABLET ORAL 2 TIMES DAILY
Qty: 180 TABLET | Refills: 0 | Status: SHIPPED | OUTPATIENT
Start: 2024-05-07

## 2024-05-07 NOTE — PROGRESS NOTES
Jean Claude returns for echo follow up status post 8/23/21 avr asc post op pericardial window 9/5/21. Recent ct scan 4/30/24. Mell Orosco RN    This is a 68 years old patient with a history of a previous aortic valve replacement, ascending and hemiarch replacement.  He has significant aortopathy.  We have been following today with echocardiogram which shows normal working valve with no other significant findings.  CT scans shows progressive dilatation of the descending thoracic aorta in a very elevated aorta as well.  It is also concerning the presence of a right iliac aneurysm.  I will continue to follow this patient but I would like this patient to be seen by Dr. Niranjan Reinoso for his opinion about these iliac aneurysm which is the one I think need to be looked after sooner than the other segment of the aorta.  In the meantime he is advised to maintain blood pressure control keep regular visit with his cardiology.  From the aortic valve point of view we will continue to follow with echo in 1 more year.  We wait for Dr. Reinoso's opinion in order to establish the neck steps from the aortic point of view.

## 2024-05-08 ENCOUNTER — HOSPITAL ENCOUNTER (OUTPATIENT)
Dept: CARDIOLOGY | Facility: HOSPITAL | Age: 69
Discharge: HOME | End: 2024-05-08
Payer: MEDICARE

## 2024-05-08 ENCOUNTER — OFFICE VISIT (OUTPATIENT)
Dept: CARDIAC SURGERY | Facility: HOSPITAL | Age: 69
End: 2024-05-08
Payer: MEDICARE

## 2024-05-08 VITALS
HEIGHT: 73 IN | OXYGEN SATURATION: 94 % | WEIGHT: 239 LBS | SYSTOLIC BLOOD PRESSURE: 131 MMHG | HEART RATE: 63 BPM | BODY MASS INDEX: 31.68 KG/M2 | DIASTOLIC BLOOD PRESSURE: 80 MMHG

## 2024-05-08 DIAGNOSIS — Z95.3 S/P AORTIC VALVE REPLACEMENT WITH BIOPROSTHETIC VALVE: ICD-10-CM

## 2024-05-08 DIAGNOSIS — I35.9 AORTIC VALVE DISEASE: Primary | ICD-10-CM

## 2024-05-08 LAB
AORTIC VALVE MEAN GRADIENT: 12 MMHG
AORTIC VALVE PEAK VELOCITY: 2.25 M/S
AV PEAK GRADIENT: 20.3 MMHG
AVA (PEAK VEL): 1.48 CM2
AVA (VTI): 1.54 CM2
LEFT VENTRICLE INTERNAL DIMENSION DIASTOLE: 4 CM (ref 3.5–6)
LEFT VENTRICULAR OUTFLOW TRACT DIAMETER: 2.3 CM
MITRAL VALVE E/A RATIO: 0.95
MITRAL VALVE E/E' RATIO: 7.5
RIGHT VENTRICLE FREE WALL PEAK S': 9.25 CM/S

## 2024-05-08 PROCEDURE — 99215 OFFICE O/P EST HI 40 MIN: CPT | Performed by: THORACIC SURGERY (CARDIOTHORACIC VASCULAR SURGERY)

## 2024-05-08 PROCEDURE — 1126F AMNT PAIN NOTED NONE PRSNT: CPT | Performed by: THORACIC SURGERY (CARDIOTHORACIC VASCULAR SURGERY)

## 2024-05-08 PROCEDURE — 1036F TOBACCO NON-USER: CPT | Performed by: THORACIC SURGERY (CARDIOTHORACIC VASCULAR SURGERY)

## 2024-05-08 PROCEDURE — 1159F MED LIST DOCD IN RCRD: CPT | Performed by: THORACIC SURGERY (CARDIOTHORACIC VASCULAR SURGERY)

## 2024-05-08 PROCEDURE — 3008F BODY MASS INDEX DOCD: CPT | Performed by: THORACIC SURGERY (CARDIOTHORACIC VASCULAR SURGERY)

## 2024-05-08 PROCEDURE — 93306 TTE W/DOPPLER COMPLETE: CPT | Performed by: INTERNAL MEDICINE

## 2024-05-08 PROCEDURE — 93306 TTE W/DOPPLER COMPLETE: CPT

## 2024-05-08 ASSESSMENT — PAIN SCALES - GENERAL: PAINLEVEL: 0-NO PAIN

## 2024-05-09 DIAGNOSIS — Z95.2 STATUS POST AORTIC VALVE REPLACEMENT: ICD-10-CM

## 2024-05-14 ENCOUNTER — OFFICE VISIT (OUTPATIENT)
Dept: WOUND CARE | Facility: CLINIC | Age: 69
End: 2024-05-14
Payer: MEDICARE

## 2024-05-14 PROCEDURE — 99212 OFFICE O/P EST SF 10 MIN: CPT

## 2024-05-20 PROBLEM — Z71.89 CARDIAC RISK COUNSELING: Status: ACTIVE | Noted: 2024-05-20

## 2024-05-20 PROBLEM — I71.20 THORACIC AORTIC ANEURYSM (TAA) (CMS-HCC): Status: ACTIVE | Noted: 2024-05-07

## 2024-05-20 PROBLEM — Z86.79 HISTORY OF ATRIAL FIBRILLATION: Status: ACTIVE | Noted: 2024-05-20

## 2024-05-20 PROBLEM — R53.83 FATIGUE: Status: ACTIVE | Noted: 2024-05-07

## 2024-05-20 PROBLEM — I10 BENIGN ESSENTIAL HYPERTENSION: Status: ACTIVE | Noted: 2024-05-07

## 2024-05-22 DIAGNOSIS — R56.9 SEIZURE (MULTI): ICD-10-CM

## 2024-05-22 RX ORDER — OXCARBAZEPINE 600 MG/1
600 TABLET, FILM COATED ORAL 2 TIMES DAILY
Qty: 180 TABLET | Refills: 0 | Status: SHIPPED | OUTPATIENT
Start: 2024-05-22 | End: 2024-05-29 | Stop reason: SDUPTHER

## 2024-05-29 ENCOUNTER — OFFICE VISIT (OUTPATIENT)
Dept: NEUROLOGY | Facility: CLINIC | Age: 69
End: 2024-05-29
Payer: MEDICARE

## 2024-05-29 ENCOUNTER — APPOINTMENT (OUTPATIENT)
Dept: CARDIOLOGY | Facility: CLINIC | Age: 69
End: 2024-05-29
Payer: MEDICARE

## 2024-05-29 VITALS
WEIGHT: 240 LBS | HEIGHT: 73 IN | HEART RATE: 65 BPM | SYSTOLIC BLOOD PRESSURE: 162 MMHG | DIASTOLIC BLOOD PRESSURE: 78 MMHG | BODY MASS INDEX: 31.81 KG/M2

## 2024-05-29 DIAGNOSIS — R56.9 SEIZURE (MULTI): ICD-10-CM

## 2024-05-29 PROCEDURE — 1159F MED LIST DOCD IN RCRD: CPT | Performed by: PSYCHIATRY & NEUROLOGY

## 2024-05-29 PROCEDURE — 3008F BODY MASS INDEX DOCD: CPT | Performed by: PSYCHIATRY & NEUROLOGY

## 2024-05-29 PROCEDURE — 3077F SYST BP >= 140 MM HG: CPT | Performed by: PSYCHIATRY & NEUROLOGY

## 2024-05-29 PROCEDURE — 99214 OFFICE O/P EST MOD 30 MIN: CPT | Performed by: PSYCHIATRY & NEUROLOGY

## 2024-05-29 PROCEDURE — 1126F AMNT PAIN NOTED NONE PRSNT: CPT | Performed by: PSYCHIATRY & NEUROLOGY

## 2024-05-29 PROCEDURE — 3078F DIAST BP <80 MM HG: CPT | Performed by: PSYCHIATRY & NEUROLOGY

## 2024-05-29 RX ORDER — ALPHA LIPOIC ACID 300 MG
1 CAPSULE ORAL 2 TIMES DAILY
COMMUNITY

## 2024-05-29 RX ORDER — OXCARBAZEPINE 600 MG/1
600 TABLET, FILM COATED ORAL 2 TIMES DAILY
Qty: 180 TABLET | Refills: 3 | Status: SHIPPED | OUTPATIENT
Start: 2024-05-29

## 2024-05-29 ASSESSMENT — PAIN SCALES - GENERAL: PAINLEVEL: 0-NO PAIN

## 2024-05-29 NOTE — PROGRESS NOTES
"Subjective   Jean Claude Viramontes is a 68 y.o. male who comes in for follow up of epilepsy.    He denies any seizures. He is taking B12 and alpha lipoic acid which is helping his neuropathy.      Since his last appointment, he broke his left ankle.  He had a valve replaced.  He is going to see a vascular surgeon for enlarged thoracic aorta.      Most recent HemAIC 6.9.  He is going to follow up with his PCP about it.     Review of Systems    Objective   /78 (BP Location: Right arm, Patient Position: Sitting, BP Cuff Size: Large adult)   Pulse 65   Ht 1.854 m (6' 1\")   Wt 109 kg (240 lb)   BMI 31.66 kg/m²   Neurological Exam  Physical Exam    EOMI, no weakness, gait is stable.     Assessment/Plan   Mr. Viramontes is a 65-year-old man presenting to the neurology clinic today to follow up for epilepsy. The patient has a history of complex partial seizures with secondary generalization per his prior neurology notes which were felt to be secondary to a head trauma in the 1990s. He is currently on Trileptal monotherapy with good control of his seizures and denies any events of the last 12 years. He tolerates the Trileptal without any side effects. Reviewed blood work from April 2024 which included normal renal function and sodium level.      He will need to continue to follow on a yearly basis.     Irene Sams DO  "

## 2024-06-11 ENCOUNTER — APPOINTMENT (OUTPATIENT)
Dept: CARDIOLOGY | Facility: CLINIC | Age: 69
End: 2024-06-11
Payer: MEDICARE

## 2024-06-24 ENCOUNTER — OFFICE VISIT (OUTPATIENT)
Dept: VASCULAR SURGERY | Facility: HOSPITAL | Age: 69
End: 2024-06-24
Payer: MEDICARE

## 2024-06-24 VITALS
OXYGEN SATURATION: 96 % | DIASTOLIC BLOOD PRESSURE: 75 MMHG | WEIGHT: 235 LBS | HEART RATE: 67 BPM | BODY MASS INDEX: 31.14 KG/M2 | HEIGHT: 73 IN | SYSTOLIC BLOOD PRESSURE: 157 MMHG

## 2024-06-24 DIAGNOSIS — I77.1 RIGHT ILIAC ARTERY STENOSIS (CMS-HCC): Primary | ICD-10-CM

## 2024-06-24 DIAGNOSIS — I74.09 AORTOILIAC OCCLUSIVE DISEASE (MULTI): ICD-10-CM

## 2024-06-24 PROCEDURE — 3008F BODY MASS INDEX DOCD: CPT | Performed by: SURGERY

## 2024-06-24 PROCEDURE — 3077F SYST BP >= 140 MM HG: CPT | Performed by: SURGERY

## 2024-06-24 PROCEDURE — 99204 OFFICE O/P NEW MOD 45 MIN: CPT | Performed by: SURGERY

## 2024-06-24 PROCEDURE — 1159F MED LIST DOCD IN RCRD: CPT | Performed by: SURGERY

## 2024-06-24 PROCEDURE — 99214 OFFICE O/P EST MOD 30 MIN: CPT | Performed by: SURGERY

## 2024-06-24 PROCEDURE — 3078F DIAST BP <80 MM HG: CPT | Performed by: SURGERY

## 2024-06-24 ASSESSMENT — ENCOUNTER SYMPTOMS
APNEA: 0
OCCASIONAL FEELINGS OF UNSTEADINESS: 0
LOSS OF SENSATION IN FEET: 0
ABDOMINAL DISTENTION: 0
DEPRESSION: 0

## 2024-06-24 NOTE — PROGRESS NOTES
Vascular Surgery Consult/Clinic Note    CC: Right iliac aneurysm    HPI:  Jean Claude Viramontes is 68 y.o. male with history of a previous aortic valve replacement, ascending and hemiarch replacement.   He was noted to have incidental finding of right iliac aneurysm thus referred to me by Dr. Mishra.   Patient denies any abdominal or back pain.       Meds:   Current Outpatient Medications on File Prior to Visit   Medication Sig Dispense Refill    acetaminophen (Tylenol) 325 mg tablet Take by mouth every 6 hours if needed.      alpha lipoic acid 300 mg capsule Take 1 capsule by mouth 2 times a day.      aspirin 81 mg EC tablet Take 1 tablet (81 mg) by mouth once daily.      atorvastatin (Lipitor) 10 mg tablet TAKE 1 TABLET BY MOUTH ONCE DAILY .  MAKE  AN  APPOINTMENT  FOR  ADDITIONAL  REFILLS 90 tablet 3    cyanocobalamin, vitamin B-12, (VITAMIN B-12 ORAL) Take by mouth.      hydroCHLOROthiazide (HYDRODiuril) 25 mg tablet Take 1 tablet (25 mg) by mouth once daily. Need an apt for additional refills 90 tablet 3    metoprolol tartrate (Lopressor) 50 mg tablet Take 1 tablet by mouth 2 times a day. 180 tablet 0    OXcarbazepine (Trileptal) 600 mg tablet Take 1 tablet (600 mg) by mouth 2 times a day. 180 tablet 3    sildenafil (Viagra) 100 mg tablet Take 1 tablet (100 mg) by mouth once daily as needed for erectile dysfunction. 10 tablet 3    docusate sodium (Colace) 100 mg capsule Take by mouth 2 times a day as needed.      hydrocortisone (Anusol-HC) 2.5 % rectal cream Insert into the rectum 2 times a day for 10 days. 30 g 6    polyethylene glycol (Glycolax, Miralax) 17 gram/dose powder Take 17 g by mouth.      psyllium (Metamucil) 3.4 gram packet Take 1 packet by mouth once daily. Mix and drink with at least 8 ounces of water or juice. (Patient not taking: Reported on 5/29/2024) 30 packet 11    sodium,potassium,mag sulfates (Suprep Bowel Prep Kit) 17.5-3.13-1.6 gram recon soln solution Take 1 bottle by mouth 2 times a day. Take  one bottle twice as directed by the prep instructions (Patient not taking: Reported on 6/24/2024) 354 mL 0    [DISCONTINUED] chlorhexidine (Peridex) 0.12 % solution RINSE WITH 15ML FOR 30 SECONDS AND SPIT, USE TWICE DAILY AFTER BRUSHING. DO NOT SWALLOW      [DISCONTINUED] fluticasone (Flonase) 50 mcg/actuation nasal spray Administer 2 sprays into affected nostril(s) once daily.       No current facility-administered medications on file prior to visit.        Allergies:   Allergies   Allergen Reactions    Phenytoin Sodium Extended Other    Phenytoin Rash       SH:    Social Determinants of Health     Tobacco Use: Medium Risk (5/8/2024)    Patient History     Smoking Tobacco Use: Former     Smokeless Tobacco Use: Never     Passive Exposure: Not on file   Alcohol Use: Not At Risk (10/17/2023)    AUDIT-C     Frequency of Alcohol Consumption: Never     Average Number of Drinks: Patient does not drink     Frequency of Binge Drinking: Never   Financial Resource Strain: Low Risk  (10/17/2023)    Overall Financial Resource Strain (CARDIA)     Difficulty of Paying Living Expenses: Not hard at all   Food Insecurity: No Food Insecurity (10/17/2023)    Hunger Vital Sign     Worried About Running Out of Food in the Last Year: Never true     Ran Out of Food in the Last Year: Never true   Transportation Needs: No Transportation Needs (10/17/2023)    PRAPARE - Transportation     Lack of Transportation (Medical): No     Lack of Transportation (Non-Medical): No   Physical Activity: Not on File (4/1/2021)    Received from [employer]     Physical Activity     Physical Activity: 0   Stress: No Stress Concern Present (10/17/2023)    [place] citizen [employer] - Occupational Stress Questionnaire     Feeling of Stress : Not at all   Social Connections: Not on File (4/1/2021)    Received from [facility]     Social Connections     Social Connections and Isolation: 0   Intimate Partner Violence: Not At Risk (10/17/2023)    Humiliation,  Afraid, Rape, and Kick questionnaire     Fear of Current or Ex-Partner: No     Emotionally Abused: No     Physically Abused: No     Sexually Abused: No   Depression: Not at risk (10/17/2023)    PHQ-2     PHQ-2 Score: 0   Housing Stability: Unknown (10/17/2023)    Housing Stability Vital Sign     Unable to Pay for Housing in the Last Year: No     Number of Places Lived in the Last Year: Not on file     Unstable Housing in the Last Year: No   Utilities: Not At Risk (10/17/2023)    [facility] Utilities     Threatened with loss of utilities: No   Digital Equity: Not on file   Health Literacy: Not on file        FH:  No family history on file.     ROS:  Review of Systems   HENT:  Negative for congestion.    Respiratory:  Negative for apnea.    Cardiovascular:  Negative for chest pain.   Gastrointestinal:  Negative for abdominal distention.        Objective:  Vitals:  Vitals:    06/24/24 0928   BP: 157/75   Pulse:    SpO2:         Exam:  Gen: in NAD  GI: Soft, ND/NT  Ext:  B DP with palpable pulses.     Imaging:  CTA c/a/p (4/30/2024) independently reviewed.   R DIPTI aneurysm approx. 2.9 x 3.2 cm from 2.6 cm (9/4/2023)        Assessment & Plan:  Jean Claude Viramontes is 68 y.o. male with history of R iliac aneurysm; Asx approx 2.9 x 3.2 cm.    - Cont. ASA and statin therapy with BP control.    - Follow up in November with aortoiliac duplex for surveillance.       Niranjan Reinoso MD, PhD  Clinical   [employer]  Co-Director, Aortic Center  [facility] [place]

## 2024-06-26 ENCOUNTER — APPOINTMENT (OUTPATIENT)
Dept: CARDIOLOGY | Facility: CLINIC | Age: 69
End: 2024-06-26
Payer: MEDICARE

## 2024-06-26 VITALS
WEIGHT: 237.2 LBS | DIASTOLIC BLOOD PRESSURE: 78 MMHG | BODY MASS INDEX: 31.29 KG/M2 | SYSTOLIC BLOOD PRESSURE: 135 MMHG | HEART RATE: 75 BPM | OXYGEN SATURATION: 97 %

## 2024-06-26 DIAGNOSIS — R06.02 SHORTNESS OF BREATH: ICD-10-CM

## 2024-06-26 DIAGNOSIS — Z95.2 S/P AORTIC VALVE REPLACEMENT: ICD-10-CM

## 2024-06-26 DIAGNOSIS — Z86.79 HISTORY OF ATRIAL FIBRILLATION: ICD-10-CM

## 2024-06-26 DIAGNOSIS — Z77.090 ASBESTOS EXPOSURE: ICD-10-CM

## 2024-06-26 DIAGNOSIS — I73.9 PERIPHERAL VASCULAR DISEASE (CMS-HCC): ICD-10-CM

## 2024-06-26 DIAGNOSIS — I10 BENIGN ESSENTIAL HYPERTENSION: ICD-10-CM

## 2024-06-26 DIAGNOSIS — Z71.89 CARDIAC RISK COUNSELING: ICD-10-CM

## 2024-06-26 DIAGNOSIS — I71.20 THORACIC AORTIC ANEURYSM WITHOUT RUPTURE, UNSPECIFIED PART (CMS-HCC): ICD-10-CM

## 2024-06-26 DIAGNOSIS — I31.39 PERICARDIAL EFFUSION (HHS-HCC): Primary | ICD-10-CM

## 2024-06-26 PROCEDURE — 3078F DIAST BP <80 MM HG: CPT | Performed by: INTERNAL MEDICINE

## 2024-06-26 PROCEDURE — 93000 ELECTROCARDIOGRAM COMPLETE: CPT | Performed by: INTERNAL MEDICINE

## 2024-06-26 PROCEDURE — 3075F SYST BP GE 130 - 139MM HG: CPT | Performed by: INTERNAL MEDICINE

## 2024-06-26 PROCEDURE — 1159F MED LIST DOCD IN RCRD: CPT | Performed by: INTERNAL MEDICINE

## 2024-06-26 PROCEDURE — 99215 OFFICE O/P EST HI 40 MIN: CPT | Performed by: INTERNAL MEDICINE

## 2024-06-26 PROCEDURE — 3008F BODY MASS INDEX DOCD: CPT | Performed by: INTERNAL MEDICINE

## 2024-06-26 PROCEDURE — 1160F RVW MEDS BY RX/DR IN RCRD: CPT | Performed by: INTERNAL MEDICINE

## 2024-06-26 NOTE — PROGRESS NOTES
Chief Complaint:   Follow-up (Last seen 10-5-2022)     History Of Present Illness:    Jean Claude Viramontes is a 68 y.o. male presenting with cardiovascular disease.  Some ENCISO-no associated cough /wheeze  Patient denies chest pain/SOB/palpitations/dizziness/lightheadedness/edema/claudication    Active-golfs/fishes/works around house/rides electric bike       Last Recorded Vitals:  Vitals:    06/26/24 1349 06/26/24 1425   BP: (!) 142/96 135/78   BP Location: Left arm    Patient Position: Sitting    Pulse: 75    SpO2: 97%    Weight: 108 kg (237 lb 3.2 oz)             Allergies:  Phenytoin sodium extended and Phenytoin    Outpatient Medications:  Current Outpatient Medications   Medication Instructions    alpha lipoic acid 300 mg capsule 1 capsule, oral, 2 times daily    aspirin 81 mg EC tablet 1 tablet, oral, Daily    atorvastatin (Lipitor) 10 mg tablet TAKE 1 TABLET BY MOUTH ONCE DAILY .  MAKE  AN  APPOINTMENT  FOR  ADDITIONAL  REFILLS    cyanocobalamin, vitamin B-12, (VITAMIN B-12 ORAL) 1 tablet, oral, Daily    hydroCHLOROthiazide (HYDRODIURIL) 25 mg, oral, Daily, Need an apt for additional refills    hydrocortisone (Anusol-HC) 2.5 % rectal cream rectal, 2 times daily    metoprolol tartrate (LOPRESSOR) 50 mg, oral, 2 times daily    OXcarbazepine (TRILEPTAL) 600 mg, oral, 2 times daily    psyllium (Metamucil) 3.4 gram packet 1 packet, oral, Daily, Mix and drink with at least 8 ounces of water or juice.    sildenafil (VIAGRA) 100 mg, oral, Daily PRN    sodium,potassium,mag sulfates (Suprep Bowel Prep Kit) 17.5-3.13-1.6 gram recon soln solution 1 bottle, oral, 2 times daily, Take one bottle twice as directed by the prep instructions       Physical Exam:  Constitutional:       Appearance: Healthy appearance. Not in distress.   Neck:      Vascular: No JVR. JVD normal.   Pulmonary:      Effort: Pulmonary effort is normal.      Breath sounds: Normal breath sounds. No wheezing. No rhonchi. No rales.   Chest:      Chest wall: Not  tender to palpatation.   Cardiovascular:      PMI at left midclavicular line. Normal rate. Regular rhythm. Normal S1. Normal S2.       Murmurs: There is a grade 2/6 crescendo-decrescendo midsystolic murmur at the URSB.      No gallop.  No click. No rub.   Pulses:     Intact distal pulses.   Edema:     Peripheral edema absent.   Abdominal:      General: Bowel sounds are normal.      Palpations: Abdomen is soft.      Tenderness: There is no abdominal tenderness.   Musculoskeletal: Normal range of motion.         General: No tenderness. Skin:     General: Skin is warm and dry.   Neurological:      General: No focal deficit present.      Mental Status: Alert and oriented to person, place and time.          Last Labs:  CBC -  Lab Results   Component Value Date    WBC 7.9 10/17/2023    HGB 16.0 10/17/2023    HCT 47.3 10/17/2023    MCV 96 10/17/2023     10/17/2023       CMP -  Lab Results   Component Value Date    CALCIUM 8.8 04/25/2024    PHOS 4.6 09/09/2021    PROT 7.7 10/17/2023    ALBUMIN 4.3 10/17/2023    AST 18 10/17/2023    ALT 22 10/17/2023    ALKPHOS 72 10/17/2023    BILITOT 0.6 10/17/2023       LIPID PANEL -   Lab Results   Component Value Date    CHOL 136 10/17/2023    TRIG 250 (H) 10/17/2023    HDL 29.5 10/17/2023    CHHDL 4.6 10/17/2023    LDLF 85 07/01/2021    VLDL 50 (H) 10/17/2023   Ldl=57    RENAL FUNCTION PANEL -   Lab Results   Component Value Date    GLUCOSE 163 (H) 04/25/2024     04/25/2024    K 4.0 04/25/2024     04/25/2024    CO2 26 04/25/2024    ANIONGAP 12 04/25/2024    BUN 18 04/25/2024    CREATININE 0.91 04/25/2024    GFRMALE >90 04/27/2023    CALCIUM 8.8 04/25/2024    PHOS 4.6 09/09/2021    ALBUMIN 4.3 10/17/2023        Lab Results   Component Value Date     (H) 09/03/2021    HGBA1C 6.9 (H) 04/18/2024    HGBA1C 5.3 12/20/2018                 Lab review: I have personally reviewed the laboratory result(s)       Problem List Items Addressed This Visit       S/P aortic  valve replacement    Overview     8/23/2017 27mm Inspiris bioprosthetic valve for AS (note: NL cors on preop cath)   Stable valve function per 5/2024 echo         Pericardial effusion ([facility]) - Primary    Overview      S/P 9/5/21 pericardial window  No pericardial effusion or thickening per 5/2024 echo         Benign essential hypertension    Overview     BP stable on current meds  4/2024 BMP okay         Relevant Orders    ECG 12 Lead (Completed)    Follow Up In Cardiology    Thoracic aortic aneurysm (TAA) (CMS-HCC)    Overview     s/p 8/23/2021 32 mm GelWeave graft  Stable 4/30/2024 chest CT  Follows with Dr. Pollard at [facility]         Shortness of breath    Overview     Patient with persistent shortness of breath despite undergoing aortic valve replacement for aortic valve disease.  5/2024 echo was unremarkable.  Normal coronary arteries on preop cardiac cath.  No dilatation of the pulmonary arteries on chest CT  Chest CT did show pleural plaques suggestive of asbestosis which may be the etiology of the shortness of breath.  Will refer to pulmonary         Relevant Orders    Referral to Pulmonology    History of atrial fibrillation    Overview     Patient developed A-fib w/RVR 9/3/2021 in setting of pericardial effusion   Converted to NSR on IV Amiodarone   In NSR now after course of PO Amiodarone   On ASA / beta blocker   Follow           Cardiac risk counseling    Overview     10/2023 total cholesterol close 136 with an LDL cholesterol equals 57  Triglycerides were elevated at 250-patient is scheduled to see his primary care doctor about elevated blood sugars.         Peripheral vascular disease (CMS-HCC)    Overview     Patient with right iliac artery aneurysm (2.9 x 3.2 cm).  There is no significant change versus the prior study.  He is being followed by vascular surgery.         Asbestos exposure    Relevant Orders    Referral to Pulmonology       Refer to pulmonary -SOB/hz of asbestos exposure  Stay  active  Weight loss    Keegan Conley, DO

## 2024-07-09 ENCOUNTER — APPOINTMENT (OUTPATIENT)
Dept: PRIMARY CARE | Facility: CLINIC | Age: 69
End: 2024-07-09
Payer: MEDICARE

## 2024-07-09 VITALS
TEMPERATURE: 97.6 F | HEART RATE: 62 BPM | BODY MASS INDEX: 31.14 KG/M2 | WEIGHT: 235 LBS | DIASTOLIC BLOOD PRESSURE: 72 MMHG | SYSTOLIC BLOOD PRESSURE: 122 MMHG | OXYGEN SATURATION: 97 % | HEIGHT: 73 IN

## 2024-07-09 DIAGNOSIS — J42 CHRONIC BRONCHITIS, UNSPECIFIED CHRONIC BRONCHITIS TYPE (MULTI): Primary | ICD-10-CM

## 2024-07-09 DIAGNOSIS — Z95.2 S/P AORTIC VALVE REPLACEMENT: ICD-10-CM

## 2024-07-09 DIAGNOSIS — I73.9 PERIPHERAL VASCULAR DISEASE (CMS-HCC): ICD-10-CM

## 2024-07-09 DIAGNOSIS — E11.9 DIABETES MELLITUS WITHOUT COMPLICATION (MULTI): ICD-10-CM

## 2024-07-09 DIAGNOSIS — I10 BENIGN ESSENTIAL HYPERTENSION: ICD-10-CM

## 2024-07-09 DIAGNOSIS — I71.20 THORACIC AORTIC ANEURYSM WITHOUT RUPTURE, UNSPECIFIED PART (CMS-HCC): ICD-10-CM

## 2024-07-09 DIAGNOSIS — N52.9 ERECTILE DYSFUNCTION, UNSPECIFIED ERECTILE DYSFUNCTION TYPE: ICD-10-CM

## 2024-07-09 PROCEDURE — 1159F MED LIST DOCD IN RCRD: CPT | Performed by: FAMILY MEDICINE

## 2024-07-09 PROCEDURE — 1126F AMNT PAIN NOTED NONE PRSNT: CPT | Performed by: FAMILY MEDICINE

## 2024-07-09 PROCEDURE — 99214 OFFICE O/P EST MOD 30 MIN: CPT | Performed by: FAMILY MEDICINE

## 2024-07-09 PROCEDURE — 3044F HG A1C LEVEL LT 7.0%: CPT | Performed by: FAMILY MEDICINE

## 2024-07-09 PROCEDURE — 1036F TOBACCO NON-USER: CPT | Performed by: FAMILY MEDICINE

## 2024-07-09 PROCEDURE — 3074F SYST BP LT 130 MM HG: CPT | Performed by: FAMILY MEDICINE

## 2024-07-09 PROCEDURE — 3078F DIAST BP <80 MM HG: CPT | Performed by: FAMILY MEDICINE

## 2024-07-09 PROCEDURE — 3008F BODY MASS INDEX DOCD: CPT | Performed by: FAMILY MEDICINE

## 2024-07-09 PROCEDURE — 1160F RVW MEDS BY RX/DR IN RCRD: CPT | Performed by: FAMILY MEDICINE

## 2024-07-09 RX ORDER — SILDENAFIL 100 MG/1
100 TABLET, FILM COATED ORAL DAILY PRN
Qty: 10 TABLET | Refills: 3 | Status: SHIPPED | OUTPATIENT
Start: 2024-07-09

## 2024-07-09 ASSESSMENT — ENCOUNTER SYMPTOMS
NEUROLOGICAL NEGATIVE: 1
DEPRESSION: 0
LOSS OF SENSATION IN FEET: 0
CARDIOVASCULAR NEGATIVE: 1
OCCASIONAL FEELINGS OF UNSTEADINESS: 0
PSYCHIATRIC NEGATIVE: 1

## 2024-07-09 ASSESSMENT — PAIN SCALES - GENERAL: PAINLEVEL: 0-NO PAIN

## 2024-07-09 NOTE — PROGRESS NOTES
"Subjective   Patient ID: Jean Claude Viramontes is a 68 y.o. male who presents for consultation (Discuss a1c).    HPI     Review of Systems   HENT: Negative.     Cardiovascular: Negative.         Dr Conley   Endocrine:        6.9 A1C   Neurological: Negative.    Psychiatric/Behavioral: Negative.         Objective   /72 (BP Location: Left arm)   Pulse 62   Temp 36.4 °C (97.6 °F) (Temporal)   Ht 1.854 m (6' 1\")   Wt 107 kg (235 lb)   SpO2 97%   BMI 31.00 kg/m²     Physical Exam  Vitals and nursing note reviewed.   Constitutional:       Appearance: Normal appearance.   Cardiovascular:      Rate and Rhythm: Regular rhythm.      Heart sounds: Normal heart sounds.   Pulmonary:      Breath sounds: Normal breath sounds.   Neurological:      Mental Status: He is alert and oriented to person, place, and time.   Psychiatric:         Behavior: Behavior normal.         Assessment/Plan patient seen here to follow-up on a elevated hemoglobin A1c at 6.9%.  We are going to send him to nutritional counseling first before we start any medication once he has seen the dietitian I like to see him back after he has changed his diet for 3 months and recheck his hemoglobin A1c.  Problem List Items Addressed This Visit             ICD-10-CM    S/P aortic valve replacement Z95.2    Relevant Orders    Referral to Nutrition Services    Benign essential hypertension I10    Thoracic aortic aneurysm (TAA) (CMS-HCC) I71.20    Peripheral vascular disease (CMS-HCC) I73.9    Relevant Orders    Referral to Nutrition Services    Chronic bronchitis, unspecified chronic bronchitis type (Multi) - Primary J42    Diabetes mellitus without complication (Multi) E11.9    Relevant Orders    Referral to Nutrition Services     Other Visit Diagnoses         Codes    Erectile dysfunction, unspecified erectile dysfunction type     N52.9    Relevant Medications    sildenafil (Viagra) 100 mg tablet               "

## 2024-08-05 DIAGNOSIS — I48.91 ATRIAL FIBRILLATION, UNSPECIFIED TYPE (MULTI): ICD-10-CM

## 2024-08-05 RX ORDER — METOPROLOL TARTRATE 50 MG/1
50 TABLET ORAL 2 TIMES DAILY
Qty: 180 TABLET | Refills: 3 | Status: SHIPPED | OUTPATIENT
Start: 2024-08-05

## 2024-10-01 ENCOUNTER — ANESTHESIA EVENT (OUTPATIENT)
Dept: GASTROENTEROLOGY | Facility: HOSPITAL | Age: 69
End: 2024-10-01
Payer: MEDICARE

## 2024-10-01 ENCOUNTER — ANESTHESIA (OUTPATIENT)
Dept: GASTROENTEROLOGY | Facility: HOSPITAL | Age: 69
End: 2024-10-01
Payer: MEDICARE

## 2024-10-01 ENCOUNTER — HOSPITAL ENCOUNTER (OUTPATIENT)
Dept: GASTROENTEROLOGY | Facility: HOSPITAL | Age: 69
Discharge: HOME | End: 2024-10-01
Payer: MEDICARE

## 2024-10-01 VITALS
SYSTOLIC BLOOD PRESSURE: 134 MMHG | OXYGEN SATURATION: 95 % | TEMPERATURE: 96.8 F | HEIGHT: 73 IN | RESPIRATION RATE: 16 BRPM | BODY MASS INDEX: 30.48 KG/M2 | WEIGHT: 230 LBS | DIASTOLIC BLOOD PRESSURE: 70 MMHG | HEART RATE: 57 BPM

## 2024-10-01 DIAGNOSIS — Z12.11 COLON CANCER SCREENING: Primary | ICD-10-CM

## 2024-10-01 PROCEDURE — 2500000004 HC RX 250 GENERAL PHARMACY W/ HCPCS (ALT 636 FOR OP/ED): Performed by: STUDENT IN AN ORGANIZED HEALTH CARE EDUCATION/TRAINING PROGRAM

## 2024-10-01 PROCEDURE — 7100000009 HC PHASE TWO TIME - INITIAL BASE CHARGE

## 2024-10-01 PROCEDURE — 3700000001 HC GENERAL ANESTHESIA TIME - INITIAL BASE CHARGE

## 2024-10-01 PROCEDURE — 2720000007 HC OR 272 NO HCPCS

## 2024-10-01 PROCEDURE — 45390 COLONOSCOPY W/RESECTION: CPT | Performed by: STUDENT IN AN ORGANIZED HEALTH CARE EDUCATION/TRAINING PROGRAM

## 2024-10-01 PROCEDURE — 7100000010 HC PHASE TWO TIME - EACH INCREMENTAL 1 MINUTE

## 2024-10-01 PROCEDURE — 2500000004 HC RX 250 GENERAL PHARMACY W/ HCPCS (ALT 636 FOR OP/ED)

## 2024-10-01 PROCEDURE — 45385 COLONOSCOPY W/LESION REMOVAL: CPT | Performed by: STUDENT IN AN ORGANIZED HEALTH CARE EDUCATION/TRAINING PROGRAM

## 2024-10-01 PROCEDURE — 3700000002 HC GENERAL ANESTHESIA TIME - EACH INCREMENTAL 1 MINUTE

## 2024-10-01 RX ORDER — SODIUM CHLORIDE, SODIUM LACTATE, POTASSIUM CHLORIDE, CALCIUM CHLORIDE 600; 310; 30; 20 MG/100ML; MG/100ML; MG/100ML; MG/100ML
20 INJECTION, SOLUTION INTRAVENOUS CONTINUOUS
Status: DISCONTINUED | OUTPATIENT
Start: 2024-10-01 | End: 2024-10-02 | Stop reason: HOSPADM

## 2024-10-01 RX ORDER — SODIUM CHLORIDE 0.9 % (FLUSH) 0.9 %
10 SYRINGE (ML) INJECTION EVERY 12 HOURS
Status: DISCONTINUED | OUTPATIENT
Start: 2024-10-01 | End: 2024-10-02 | Stop reason: HOSPADM

## 2024-10-01 RX ORDER — SODIUM CHLORIDE 0.9 % (FLUSH) 0.9 %
10 SYRINGE (ML) INJECTION AS NEEDED
Status: DISCONTINUED | OUTPATIENT
Start: 2024-10-01 | End: 2024-10-02 | Stop reason: HOSPADM

## 2024-10-01 RX ORDER — PROPOFOL 10 MG/ML
INJECTION, EMULSION INTRAVENOUS CONTINUOUS PRN
Status: DISCONTINUED | OUTPATIENT
Start: 2024-10-01 | End: 2024-10-01

## 2024-10-01 SDOH — HEALTH STABILITY: MENTAL HEALTH: CURRENT SMOKER: 0

## 2024-10-01 ASSESSMENT — PAIN SCALES - GENERAL
PAINLEVEL_OUTOF10: 0 - NO PAIN

## 2024-10-01 ASSESSMENT — PAIN - FUNCTIONAL ASSESSMENT: PAIN_FUNCTIONAL_ASSESSMENT: 0-10

## 2024-10-01 NOTE — ANESTHESIA POSTPROCEDURE EVALUATION
Patient: Jean Claude Viramontes    Procedure Summary       Date: 10/01/24 Room / Location: [facility]    Anesthesia Start: 0944 Anesthesia Stop: 1023    Procedure: COLONOSCOPY Diagnosis:       Colon cancer screening      Colon cancer screening    Scheduled Providers: Gene Bansal MD Responsible Provider: Stef Tom MD    Anesthesia Type: MAC ASA Status: 3            Anesthesia Type: MAC    Vitals Value Taken Time   /54 10/01/24 1023   Temp 36.0 10/01/24 1023   Pulse 78 10/01/24 1023   Resp 16 10/01/24 1023   SpO2 96 10/01/24 1023       Anesthesia Post Evaluation    Patient location during evaluation: PACU  Patient participation: complete - patient participated  Level of consciousness: awake and alert  Pain management: adequate  Airway patency: patent  Cardiovascular status: acceptable  Respiratory status: acceptable  Hydration status: acceptable  Postoperative Nausea and Vomiting: none        There were no known notable events for this encounter.

## 2024-10-01 NOTE — POST-PROCEDURE NOTE
Reviewed discharge instructions, educated pt and family on anesthesia safety.   All pre-op belongings with the pt.  Dr Bansal's pt teachings reenforced with pt/spouse-sitz bath demonstration/set up also discussed & given

## 2024-10-01 NOTE — DISCHARGE INSTRUCTIONS

## 2024-10-01 NOTE — ANESTHESIA PREPROCEDURE EVALUATION
Patient: Jean Claude Viramontes    Procedure Information       Date/Time: 10/01/24 0930    Scheduled providers: Gene Bansal MD    Procedure: COLONOSCOPY    Location: [facility]            Relevant Problems   Cardiac   (+) Benign essential hypertension   (+) Peripheral vascular disease (CMS-HCC)   (+) Thoracic aortic aneurysm (TAA) (CMS-[facility])       Clinical information reviewed:    Allergies  Meds  Problems              NPO Detail:  NPO/Void Status  Carbohydrate Drink Given Prior to Surgery? : N  Date of Last Liquid: 09/30/24  Time of Last Liquid: 2200  Date of Last Solid: 09/30/24  Time of Last Solid: 0700  Last Intake Type: Clear fluids         Physical Exam    Airway  Mallampati: II  TM distance: >3 FB  Neck ROM: full     Cardiovascular - normal exam  Rhythm: regular  Rate: normal     Dental - normal exam  (+) lower dentures     Pulmonary   Breath sounds clear to auscultation  (+) decreased breath sounds     Abdominal            Anesthesia Plan    History of general anesthesia?: yes  History of complications of general anesthesia?: no    ASA 3     MAC     The patient is not a current smoker.  Education provided regarding risk of obstructive sleep apnea.  intravenous induction   Anesthetic plan and risks discussed with patient.    Plan discussed with CAA.

## 2024-10-10 LAB
LABORATORY COMMENT REPORT: NORMAL
PATH REPORT.FINAL DX SPEC: NORMAL
PATH REPORT.GROSS SPEC: NORMAL
PATH REPORT.RELEVANT HX SPEC: NORMAL
PATH REPORT.TOTAL CANCER: NORMAL

## 2024-10-22 ENCOUNTER — APPOINTMENT (OUTPATIENT)
Dept: PRIMARY CARE | Facility: CLINIC | Age: 69
End: 2024-10-22
Payer: MEDICARE

## 2024-11-18 ENCOUNTER — HOSPITAL ENCOUNTER (OUTPATIENT)
Dept: VASCULAR MEDICINE | Facility: HOSPITAL | Age: 69
Discharge: HOME | End: 2024-11-18
Payer: MEDICARE

## 2024-11-18 ENCOUNTER — OFFICE VISIT (OUTPATIENT)
Dept: VASCULAR SURGERY | Facility: HOSPITAL | Age: 69
End: 2024-11-18
Payer: MEDICARE

## 2024-11-18 VITALS
HEART RATE: 58 BPM | WEIGHT: 230 LBS | DIASTOLIC BLOOD PRESSURE: 76 MMHG | SYSTOLIC BLOOD PRESSURE: 135 MMHG | HEIGHT: 73 IN | BODY MASS INDEX: 30.48 KG/M2

## 2024-11-18 DIAGNOSIS — I72.3 ANEURYSM OF ILIAC ARTERY (CMS-HCC): ICD-10-CM

## 2024-11-18 DIAGNOSIS — I71.23 ANEURYSM OF DESCENDING THORACIC AORTA WITHOUT RUPTURE (CMS-HCC): Primary | ICD-10-CM

## 2024-11-18 DIAGNOSIS — I74.09 AORTOILIAC OCCLUSIVE DISEASE (MULTI): ICD-10-CM

## 2024-11-18 DIAGNOSIS — I77.1 RIGHT ILIAC ARTERY STENOSIS (CMS-HCC): ICD-10-CM

## 2024-11-18 DIAGNOSIS — I72.3 ILIAC ARTERY ANEURYSM, RIGHT (CMS-HCC): ICD-10-CM

## 2024-11-18 PROCEDURE — 1159F MED LIST DOCD IN RCRD: CPT | Performed by: SURGERY

## 2024-11-18 PROCEDURE — 3044F HG A1C LEVEL LT 7.0%: CPT | Performed by: SURGERY

## 2024-11-18 PROCEDURE — 3078F DIAST BP <80 MM HG: CPT | Performed by: SURGERY

## 2024-11-18 PROCEDURE — 93979 VASCULAR STUDY: CPT

## 2024-11-18 PROCEDURE — 93979 VASCULAR STUDY: CPT | Performed by: SURGERY

## 2024-11-18 PROCEDURE — 99214 OFFICE O/P EST MOD 30 MIN: CPT | Performed by: SURGERY

## 2024-11-18 PROCEDURE — 3075F SYST BP GE 130 - 139MM HG: CPT | Performed by: SURGERY

## 2024-11-18 PROCEDURE — 3008F BODY MASS INDEX DOCD: CPT | Performed by: SURGERY

## 2024-11-18 ASSESSMENT — ENCOUNTER SYMPTOMS
ABDOMINAL PAIN: 0
SHORTNESS OF BREATH: 0
CONSTITUTIONAL NEGATIVE: 1
CHEST TIGHTNESS: 0
WEAKNESS: 0

## 2024-11-18 NOTE — PROGRESS NOTES
Vascular Surgery Consult/Clinic Note    CC: R DIPTI aneurysm surveillance    HPI:  Jean Claude Viramontes is 69 y.o. male with history of a previous aortic valve replacement, ascending and hemiarch replacement.   He was noted to have incidental finding of right iliac aneurysm thus referred to clinic by Dr. Mishra.   Patient denies any abdominal or back pain. Returns to clinic today for routine surveillance.   Aortoiliac duplex done showing 2.3 x 2.5 cm aneurysm in R DIPTI. Has had no abdominopelvic pain, no claudication symptoms.     Meds:   Current Outpatient Medications on File Prior to Visit   Medication Sig Dispense Refill    alpha lipoic acid 300 mg capsule Take 1 capsule by mouth 2 times a day.      aspirin 81 mg EC tablet Take 1 tablet (81 mg) by mouth once daily.      atorvastatin (Lipitor) 10 mg tablet TAKE 1 TABLET BY MOUTH ONCE DAILY .  MAKE  AN  APPOINTMENT  FOR  ADDITIONAL  REFILLS (Patient taking differently: Take 1 tablet (10 mg) by mouth once daily.) 90 tablet 3    cyanocobalamin, vitamin B-12, (VITAMIN B-12 ORAL) Take 1 tablet by mouth once daily.      hydroCHLOROthiazide (HYDRODiuril) 25 mg tablet Take 1 tablet (25 mg) by mouth once daily. Need an apt for additional refills 90 tablet 3    hydrocortisone (Anusol-HC) 2.5 % rectal cream Insert into the rectum 2 times a day for 10 days. 30 g 6    metoprolol tartrate (Lopressor) 50 mg tablet Take 1 tablet by mouth twice daily 180 tablet 3    OXcarbazepine (Trileptal) 600 mg tablet Take 1 tablet (600 mg) by mouth 2 times a day. 180 tablet 3    sildenafil (Viagra) 100 mg tablet Take 1 tablet (100 mg) by mouth once daily as needed for erectile dysfunction. 10 tablet 3     No current facility-administered medications on file prior to visit.        Allergies:   Allergies   Allergen Reactions    Phenytoin Sodium Extended Other    Phenytoin Rash       SH:    Social Drivers of Health     Tobacco Use: Medium Risk (7/9/2024)    Patient History     Smoking Tobacco Use: Former      Smokeless Tobacco Use: Never     Passive Exposure: Not on file   Alcohol Use: Not At Risk (10/17/2023)    AUDIT-C     Frequency of Alcohol Consumption: Never     Average Number of Drinks: Patient does not drink     Frequency of Binge Drinking: Never   Financial Resource Strain: Low Risk  (10/17/2023)    Overall Financial Resource Strain (CARDIA)     Difficulty of Paying Living Expenses: Not hard at all   Food Insecurity: No Food Insecurity (10/17/2023)    Hunger Vital Sign     Worried About Running Out of Food in the Last Year: Never true     Ran Out of Food in the Last Year: Never true   Transportation Needs: No Transportation Needs (10/17/2023)    PRAPARE - Transportation     Lack of Transportation (Medical): No     Lack of Transportation (Non-Medical): No   Physical Activity: Not on File (4/1/2021)    Received from [facility]    Physical Activity     Physical Activity: 0   Stress: No Stress Concern Present (10/17/2023)    [employer] - Occupational Stress Questionnaire     Feeling of Stress : Not at all   Social Connections: Not on File (4/1/2021)    Received from [facility]    Social Connections     Social Connections and Isolation: 0   Intimate Partner Violence: Not At Risk (10/17/2023)    Humiliation, Afraid, Rape, and Kick questionnaire     Fear of Current or Ex-Partner: No     Emotionally Abused: No     Physically Abused: No     Sexually Abused: No   Depression: Not at risk (10/17/2023)    PHQ-2     PHQ-2 Score: 0   Housing Stability: Unknown (10/17/2023)    Housing Stability Vital Sign     Unable to Pay for Housing in the Last Year: No     Number of Places Lived in the Last Year: Not on file     Unstable Housing in the Last Year: No   Utilities: Not At Risk (10/17/2023)    [facility] Utilities     Threatened with loss of utilities: No   Digital Equity: Not on file   Health Literacy: Not on file        FH:  No family history on file.     ROS:  Review of Systems   Constitutional:  Negative.    Respiratory:  Negative for chest tightness and shortness of breath.    Gastrointestinal:  Negative for abdominal pain.   Neurological:  Negative for weakness.        Objective:  Vitals:  There were no vitals filed for this visit.     Exam:  Gen: in NAD  GI: Soft, ND/NT  Ext:  Palpable pulses    Imaging:  Aortoiliac duplex reviewed: 2.3 x 2.5 cm aneurysmal dilation in R DIPTI    Assessment & Plan:  Jean Claude Viramontes is 69 y.o. male with history of history of R iliac aneurysm; Asx approx 2.9 x 3.2 cm.    - Cont. ASA and statin therapy with BP control.    - Follow up 1 year with noncon CT chest/abd/pelvis to monitor thoracic aneurysm    Niranjan Reinoso MD, PhD  Clinical   [employer] [facility] [employer]  Co-Director, Aortic Center  [facility] [place]

## 2025-01-08 DIAGNOSIS — Z71.89 CARDIAC RISK COUNSELING: ICD-10-CM

## 2025-01-08 RX ORDER — ATORVASTATIN CALCIUM 10 MG/1
10 TABLET, FILM COATED ORAL DAILY
Qty: 90 TABLET | Refills: 1 | Status: SHIPPED | OUTPATIENT
Start: 2025-01-08

## 2025-01-14 DIAGNOSIS — R56.9 SEIZURE (MULTI): ICD-10-CM

## 2025-01-14 DIAGNOSIS — I48.91 ATRIAL FIBRILLATION, UNSPECIFIED TYPE (MULTI): ICD-10-CM

## 2025-01-14 RX ORDER — METOPROLOL TARTRATE 50 MG/1
TABLET ORAL
COMMUNITY
Start: 2025-01-14

## 2025-01-14 RX ORDER — OXCARBAZEPINE 600 MG/1
600 TABLET, FILM COATED ORAL 2 TIMES DAILY
Qty: 180 TABLET | Refills: 1 | Status: SHIPPED | OUTPATIENT
Start: 2025-01-14

## 2025-02-19 DIAGNOSIS — I10 HYPERTENSION, UNSPECIFIED TYPE: ICD-10-CM

## 2025-02-19 RX ORDER — HYDROCHLOROTHIAZIDE 25 MG/1
25 TABLET ORAL DAILY
Qty: 90 TABLET | Refills: 0 | Status: SHIPPED | OUTPATIENT
Start: 2025-02-19

## 2025-04-25 ENCOUNTER — HOSPITAL ENCOUNTER (EMERGENCY)
Facility: HOSPITAL | Age: 70
Discharge: HOME | End: 2025-04-25
Attending: EMERGENCY MEDICINE
Payer: MEDICARE

## 2025-04-25 VITALS
OXYGEN SATURATION: 94 % | DIASTOLIC BLOOD PRESSURE: 84 MMHG | SYSTOLIC BLOOD PRESSURE: 127 MMHG | HEART RATE: 80 BPM | TEMPERATURE: 98 F | HEIGHT: 72 IN | WEIGHT: 218.7 LBS | BODY MASS INDEX: 29.62 KG/M2 | RESPIRATION RATE: 20 BRPM

## 2025-04-25 DIAGNOSIS — E11.9 TYPE 2 DIABETES MELLITUS WITHOUT COMPLICATION, WITHOUT LONG-TERM CURRENT USE OF INSULIN: Primary | ICD-10-CM

## 2025-04-25 LAB
ACETONE BLD QL: NEGATIVE
ALBUMIN SERPL-MCNC: 4.2 G/DL (ref 3.5–5.2)
ALBUMIN/GLOB SERPL: 1.1 G/DL
ALP SERPL-CCNC: 115 U/L (ref 39–117)
ALT SERPL W P-5'-P-CCNC: 26 U/L (ref 1–41)
ANION GAP SERPL CALCULATED.3IONS-SCNC: 13.3 MMOL/L (ref 5–15)
AST SERPL-CCNC: 24 U/L (ref 1–40)
BASOPHILS # BLD AUTO: 0.04 10*3/MM3 (ref 0–0.2)
BASOPHILS NFR BLD AUTO: 0.6 % (ref 0–1.5)
BILIRUB SERPL-MCNC: 1.1 MG/DL (ref 0–1.2)
BILIRUB UR QL STRIP: NEGATIVE
BUN SERPL-MCNC: 18 MG/DL (ref 8–23)
BUN/CREAT SERPL: 18 (ref 7–25)
CALCIUM SPEC-SCNC: 9.4 MG/DL (ref 8.6–10.5)
CHLORIDE SERPL-SCNC: 91 MMOL/L (ref 98–107)
CLARITY UR: CLEAR
CO2 SERPL-SCNC: 25.7 MMOL/L (ref 22–29)
COLOR UR: YELLOW
CREAT SERPL-MCNC: 1 MG/DL (ref 0.76–1.27)
DEPRECATED RDW RBC AUTO: 41.4 FL (ref 37–54)
EGFRCR SERPLBLD CKD-EPI 2021: 81.5 ML/MIN/1.73
EOSINOPHIL # BLD AUTO: 0.04 10*3/MM3 (ref 0–0.4)
EOSINOPHIL NFR BLD AUTO: 0.6 % (ref 0.3–6.2)
ERYTHROCYTE [DISTWIDTH] IN BLOOD BY AUTOMATED COUNT: 12.2 % (ref 12.3–15.4)
GLOBULIN UR ELPH-MCNC: 3.8 GM/DL
GLUCOSE BLDC GLUCOMTR-MCNC: 320 MG/DL (ref 70–99)
GLUCOSE BLDC GLUCOMTR-MCNC: 520 MG/DL (ref 70–99)
GLUCOSE SERPL-MCNC: 515 MG/DL (ref 65–99)
GLUCOSE UR STRIP-MCNC: ABNORMAL MG/DL
HBA1C MFR BLD: 11.3 % (ref 4.8–5.6)
HCT VFR BLD AUTO: 49.1 % (ref 37.5–51)
HGB BLD-MCNC: 17.7 G/DL (ref 13–17.7)
HGB UR QL STRIP.AUTO: NEGATIVE
HOLD SPECIMEN: NORMAL
HOLD SPECIMEN: NORMAL
IMM GRANULOCYTES # BLD AUTO: 0.05 10*3/MM3 (ref 0–0.05)
IMM GRANULOCYTES NFR BLD AUTO: 0.7 % (ref 0–0.5)
KETONES UR QL STRIP: ABNORMAL
LEUKOCYTE ESTERASE UR QL STRIP.AUTO: NEGATIVE
LYMPHOCYTES # BLD AUTO: 1.05 10*3/MM3 (ref 0.7–3.1)
LYMPHOCYTES NFR BLD AUTO: 15.3 % (ref 19.6–45.3)
MCH RBC QN AUTO: 33.3 PG (ref 26.6–33)
MCHC RBC AUTO-ENTMCNC: 36 G/DL (ref 31.5–35.7)
MCV RBC AUTO: 92.3 FL (ref 79–97)
MONOCYTES # BLD AUTO: 0.53 10*3/MM3 (ref 0.1–0.9)
MONOCYTES NFR BLD AUTO: 7.7 % (ref 5–12)
NEUTROPHILS NFR BLD AUTO: 5.14 10*3/MM3 (ref 1.7–7)
NEUTROPHILS NFR BLD AUTO: 75.1 % (ref 42.7–76)
NITRITE UR QL STRIP: NEGATIVE
NRBC BLD AUTO-RTO: 0 /100 WBC (ref 0–0.2)
OSMOLALITY SERPL: 304 MOSM/KG (ref 280–301)
PH UR STRIP.AUTO: 5.5 [PH] (ref 5–8)
PLATELET # BLD AUTO: 159 10*3/MM3 (ref 140–450)
PMV BLD AUTO: 11.2 FL (ref 6–12)
POTASSIUM SERPL-SCNC: 4.5 MMOL/L (ref 3.5–5.2)
PROT SERPL-MCNC: 8 G/DL (ref 6–8.5)
PROT UR QL STRIP: NEGATIVE
RBC # BLD AUTO: 5.32 10*6/MM3 (ref 4.14–5.8)
SODIUM SERPL-SCNC: 130 MMOL/L (ref 136–145)
SP GR UR STRIP: >1.03 (ref 1–1.03)
UROBILINOGEN UR QL STRIP: ABNORMAL
WBC NRBC COR # BLD AUTO: 6.85 10*3/MM3 (ref 3.4–10.8)
WHOLE BLOOD HOLD COAG: NORMAL
WHOLE BLOOD HOLD SPECIMEN: NORMAL

## 2025-04-25 PROCEDURE — 80053 COMPREHEN METABOLIC PANEL: CPT

## 2025-04-25 PROCEDURE — 36415 COLL VENOUS BLD VENIPUNCTURE: CPT | Performed by: EMERGENCY MEDICINE

## 2025-04-25 PROCEDURE — 99284 EMERGENCY DEPT VISIT MOD MDM: CPT

## 2025-04-25 PROCEDURE — 25810000003 SODIUM CHLORIDE 0.9 % SOLUTION

## 2025-04-25 PROCEDURE — 83930 ASSAY OF BLOOD OSMOLALITY: CPT

## 2025-04-25 PROCEDURE — 96360 HYDRATION IV INFUSION INIT: CPT

## 2025-04-25 PROCEDURE — 81003 URINALYSIS AUTO W/O SCOPE: CPT

## 2025-04-25 PROCEDURE — 63710000001 INSULIN REGULAR HUMAN PER 5 UNITS

## 2025-04-25 PROCEDURE — 82948 REAGENT STRIP/BLOOD GLUCOSE: CPT

## 2025-04-25 PROCEDURE — 82009 KETONE BODYS QUAL: CPT

## 2025-04-25 PROCEDURE — 85025 COMPLETE CBC W/AUTO DIFF WBC: CPT | Performed by: EMERGENCY MEDICINE

## 2025-04-25 PROCEDURE — 82948 REAGENT STRIP/BLOOD GLUCOSE: CPT | Performed by: EMERGENCY MEDICINE

## 2025-04-25 PROCEDURE — 83036 HEMOGLOBIN GLYCOSYLATED A1C: CPT

## 2025-04-25 PROCEDURE — 99283 EMERGENCY DEPT VISIT LOW MDM: CPT

## 2025-04-25 RX ORDER — ATORVASTATIN CALCIUM 10 MG/1
10 TABLET, FILM COATED ORAL DAILY
COMMUNITY
Start: 2025-01-08

## 2025-04-25 RX ORDER — ALPHA LIPOIC ACID 300 MG
1 CAPSULE ORAL 2 TIMES DAILY
COMMUNITY

## 2025-04-25 RX ORDER — SODIUM CHLORIDE 0.9 % (FLUSH) 0.9 %
10 SYRINGE (ML) INJECTION AS NEEDED
Status: DISCONTINUED | OUTPATIENT
Start: 2025-04-25 | End: 2025-04-25 | Stop reason: HOSPADM

## 2025-04-25 RX ORDER — HYDROCHLOROTHIAZIDE 25 MG/1
25 TABLET ORAL DAILY
COMMUNITY
Start: 2025-02-19

## 2025-04-25 RX ORDER — OXCARBAZEPINE 600 MG/1
600 TABLET, FILM COATED ORAL 2 TIMES DAILY
COMMUNITY
Start: 2025-01-14

## 2025-04-25 RX ADMIN — INSULIN HUMAN 5 UNITS: 100 INJECTION, SOLUTION PARENTERAL at 17:36

## 2025-04-25 RX ADMIN — SODIUM CHLORIDE 1000 ML: 9 INJECTION, SOLUTION INTRAVENOUS at 17:36

## 2025-04-25 NOTE — DISCHARGE INSTRUCTIONS
Received call from Concepcion stating that Cameron is having difficulties swallowing and eating because of his throat. Appointment made for 12/14/2022 at 10:45am. He was advised to go to Urgent care if symptoms worsen. Concepcion verbalized understanding.    Your exam and symptoms today are consistent with  Type 2 Diabetes. Your lab work today did nto show any acute critical findings other than your blood glucose at 515. You are being discharged home with Metformin. Take this medication as prescribed. As discussed, this may cause stomach upset.    Follow up with your PCP in 3-5 days. You may need to get started on insulin.    Return to the Emergency Department if you develop any uncontrollable fever, intractable pain, nausea, vomiting.

## 2025-04-25 NOTE — ED PROVIDER NOTES
"Time: 5:02 PM EDT  Date of encounter:  4/25/2025  Independent Historian/Clinical History and Information was obtained by:   Patient    History is limited by: N/A    Chief Complaint: Hyperglycemia      History of Present Illness:  Patient is a 69 y.o. year old male who presents to the emergency department for evaluation of hyperglycemia. Reports polyuria, polydipsia for the last several days. Was seen by a nutritionist who did a POC A1C and it was 6.9. States that they were going to manage with diet and exercise but still recommended to see his GP. Denies any hx of diabetes. Today, his wife got a glucometer at the pharmacy and it read \"High.\" Denies any nausea, vomiting, AMS.     Patient Care Team  Primary Care Provider: Provider, Loni Known    Past Medical History:     Allergies   Allergen Reactions    Dilantin [Phenytoin] Hives     Past Medical History:   Diagnosis Date    Diabetes mellitus     Neuropathy      History reviewed. No pertinent surgical history.  History reviewed. No pertinent family history.    Home Medications:  Prior to Admission medications    Medication Sig Start Date End Date Taking? Authorizing Provider   atorvastatin (LIPITOR) 10 MG tablet Take 1 tablet by mouth Daily. 1/8/25  Yes Danielle Chery MD   hydroCHLOROthiazide 25 MG tablet Take 1 tablet by mouth Daily. 2/19/25  Yes Danielle Chery MD   OXcarbazepine (TRILEPTAL) 600 MG tablet Take 1 tablet by mouth 2 (Two) Times a Day. 1/14/25  Yes Danielle Chery MD   Alpha-Lipoic Acid 300 MG capsule Take 1 capsule by mouth 2 (Two) Times a Day.    Provider, MD Danielle        Social History:          Review of Systems:  Review of Systems   Constitutional:  Negative for chills and fever.   HENT:  Negative for congestion and ear pain.    Eyes:  Negative for pain.   Respiratory:  Negative for cough and shortness of breath.    Cardiovascular:  Negative for chest pain.   Gastrointestinal:  Negative for abdominal pain, diarrhea, nausea " "and vomiting.   Genitourinary:  Negative for dysuria and hematuria.   Musculoskeletal:  Negative for myalgias.   Skin:  Negative for rash.   Neurological:  Negative for dizziness and headaches.        Physical Exam:  /84 (BP Location: Left arm, Patient Position: Lying)   Pulse 80   Temp 98 °F (36.7 °C) (Oral)   Resp 20   Ht 182.9 cm (72\")   Wt 99.2 kg (218 lb 11.1 oz)   SpO2 94%   BMI 29.66 kg/m²     Physical Exam  Vitals and nursing note reviewed.   Constitutional:       General: He is not in acute distress.     Appearance: Normal appearance.   HENT:      Head: Normocephalic and atraumatic.      Nose: Nose normal.      Mouth/Throat:      Mouth: Mucous membranes are moist.   Eyes:      Pupils: Pupils are equal, round, and reactive to light.   Cardiovascular:      Rate and Rhythm: Normal rate and regular rhythm.      Pulses: Normal pulses.      Heart sounds: Normal heart sounds.   Pulmonary:      Effort: Pulmonary effort is normal.      Breath sounds: Normal breath sounds.   Abdominal:      General: Abdomen is flat. Bowel sounds are normal.      Palpations: Abdomen is soft.   Musculoskeletal:         General: Normal range of motion.      Cervical back: Normal range of motion.      Right lower leg: No edema.      Left lower leg: No edema.   Skin:     General: Skin is warm and dry.      Capillary Refill: Capillary refill takes less than 2 seconds.   Neurological:      General: No focal deficit present.      Mental Status: He is alert.   Psychiatric:         Mood and Affect: Mood normal.         Behavior: Behavior normal.                    Medical Decision Making:      Comorbidities that affect care:    Diabetes    External Notes reviewed:    None      The following orders were placed and all results were independently analyzed by me:  Orders Placed This Encounter   Procedures    [place] Draw    Comprehensive Metabolic Panel    Urinalysis With Microscopic If Indicated (No Culture) - Urine, Clean Catch    " CBC Auto Differential    Acetone    Osmolality, Serum    Hemoglobin A1c    NPO Diet NPO Type: Strict NPO    Undress & Gown    Continuous Pulse Oximetry    Vital Signs    Oxygen Therapy- Nasal Cannula; Titrate 1-6 LPM Per SpO2; 90 - 95%    POC Glucose Q1H    POC Glucose Once    Insert Peripheral IV    CBC & Differential    Green Top (Gel)    Lavender Top    Gold Top - SST    Light Blue Top       Medications Given in the Emergency Department:  Medications   sodium chloride 0.9 % flush 10 mL (has no administration in time range)   sodium chloride 0.9 % bolus 1,000 mL (1,000 mL Intravenous New Bag 4/25/25 1736)   insulin regular (humuLIN R,novoLIN R) injection 5 Units (5 Units Intravenous Given 4/25/25 1736)        ED Course:         Labs:    Lab Results (last 24 hours)       Procedure Component Value Units Date/Time    POC Glucose Once [[id number]]  (Abnormal) Collected: 04/25/25 1421    Specimen: Blood Updated: 04/25/25 1423     Glucose 520 mg/dL      Comment: Serial Number: [id number]:  [id number]       CBC & Differential [[id number]]  (Abnormal) Collected: 04/25/25 1425    Specimen: Blood from Arm, Right Updated: 04/25/25 1446    Narrative:      The following orders were created for panel order CBC & Differential.  Procedure                               Abnormality         Status                     ---------                               -----------         ------                     CBC Auto Differential[[id number]]        Abnormal            Final result                 Please view results for these tests on the individual orders.    Comprehensive Metabolic Panel [[id number]]  (Abnormal) Collected: 04/25/25 1425    Specimen: Blood from Arm, Right Updated: 04/25/25 1517     Glucose 515 mg/dL      BUN 18 mg/dL      Creatinine 1.00 mg/dL      Sodium 130 mmol/L      Potassium 4.5 mmol/L      Comment: Slight hemolysis detected by analyzer. Result may be falsely elevated.        Chloride 91 mmol/L      CO2 25.7  mmol/L      Calcium 9.4 mg/dL      Total Protein 8.0 g/dL      Albumin 4.2 g/dL      ALT (SGPT) 26 U/L      AST (SGOT) 24 U/L      Alkaline Phosphatase 115 U/L      Total Bilirubin 1.1 mg/dL      Globulin 3.8 gm/dL      A/G Ratio 1.1 g/dL      BUN/Creatinine Ratio 18.0     Anion Gap 13.3 mmol/L      eGFR 81.5 mL/min/1.73     Narrative:      GFR Categories in Chronic Kidney Disease (CKD)      GFR Category          GFR (mL/min/1.73)    Interpretation  G1                     90 or greater         Normal or high (1)  G2                      60-89                Mild decrease (1)  G3a                   45-59                Mild to moderate decrease  G3b                   30-44                Moderate to severe decrease  G4                    15-29                Severe decrease  G5                    14 or less           Kidney failure          (1)In the absence of evidence of kidney disease, neither GFR category G1 or G2 fulfill the criteria for CKD.    eGFR calculation 2021 CKD-EPI creatinine equation, which does not include race as a factor    CBC Auto Differential [[id number]]  (Abnormal) Collected: 04/25/25 1425    Specimen: Blood from Arm, Right Updated: 04/25/25 1446     WBC 6.85 10*3/mm3      RBC 5.32 10*6/mm3      Hemoglobin 17.7 g/dL      Hematocrit 49.1 %      MCV 92.3 fL      MCH 33.3 pg      MCHC 36.0 g/dL      RDW 12.2 %      RDW-SD 41.4 fl      MPV 11.2 fL      Platelets 159 10*3/mm3      Neutrophil % 75.1 %      Lymphocyte % 15.3 %      Monocyte % 7.7 %      Eosinophil % 0.6 %      Basophil % 0.6 %      Immature Grans % 0.7 %      Neutrophils, Absolute 5.14 10*3/mm3      Lymphocytes, Absolute 1.05 10*3/mm3      Monocytes, Absolute 0.53 10*3/mm3      Eosinophils, Absolute 0.04 10*3/mm3      Basophils, Absolute 0.04 10*3/mm3      Immature Grans, Absolute 0.05 10*3/mm3      nRBC 0.0 /100 WBC     Acetone [[id number]]  (Normal) Collected: 04/25/25 1425    Specimen: Blood from Arm, Right Updated: 04/25/25 1624      Acetone Negative    Osmolality, Serum [[id number]]  (Abnormal) Collected: 04/25/25 1425    Specimen: Blood from Arm, Right Updated: 04/25/25 1624     Osmolality 304 mOsm/kg     Hemoglobin A1c [[id number]] Collected: 04/25/25 1425    Specimen: Blood from Arm, Right Updated: 04/25/25 1707    Urinalysis With Microscopic If Indicated (No Culture) - Urine, Clean Catch [[id number]]  (Abnormal) Collected: 04/25/25 1549    Specimen: Urine, Clean Catch Updated: 04/25/25 1617     Color, UA Yellow     Appearance, UA Clear     pH, UA 5.5     Specific Gravity, UA >1.030     Glucose, UA >=1000 mg/dL (3+)     Ketones, UA 40 mg/dL (2+)     Bilirubin, UA Negative     Blood, UA Negative     Protein, UA Negative     Leuk Esterase, UA Negative     Nitrite, UA Negative     Urobilinogen, UA 0.2 E.U./dL    Narrative:      Urine microscopic not indicated.    POC Glucose Q1H [[id number]]  (Abnormal) Collected: 04/25/25 1854    Specimen: Blood Updated: 04/25/25 1856     Glucose 320 mg/dL      Comment: Serial Number: [id number]:  [id number]                Imaging:    No Radiology Exams Resulted Within Past 24 Hours      Differential Diagnosis and Discussion:    Metabolic: Differential diagnosis includes but is not limited to hypertension, hyperglycemia, hyperkalemia, hypocalcemia, metabolic acidosis, hypokalemia, hypoglycemia, malnutrition, hypothyroidism, hyperthyroidism, and adrenal insufficiency.     PROCEDURES:    Labs were collected in the emergency department and all labs were reviewed and interpreted by me.    No orders to display       Procedures    MDM     Amount and/or Complexity of Data Reviewed  Clinical lab tests: reviewed    Risk of Complications, Morbidity, and/or Mortality  Presenting problems: moderate  Diagnostic procedures: low  Management options: low    Patient Progress  Patient progress: stable    Patient presents to the emergency department for evaluation of hyperglycemia. Reports polyuria, polydipsia for the last  "several days. Was seen by a nutritionist who did a POC A1C and it was 6.9. States that they were going to manage with diet and exercise but still recommended to see his GP. Denies any hx of diabetes. Today, his wife got a glucometer at the pharmacy and it read \"High.\" Denies any nausea, vomiting, AMS.     The patient´s CBC that was reviewed and interpreted by me shows no abnormalities of critical concern. Of note, there is no anemia requiring a blood transfusion and the platelet count is acceptable.    The patient´s CMP that was reviewed and interpreted by me shows no abnormalities of critical concern. The patient´s liver enzymes are unremarkable. The patient´s renal function (creatinine) is preserved. The patient has a normal anion gap.     Glucose 515, sodium 130 most likely pseudohyponatremia. Potassium 4.5.  Acetone negative. Osmolality 304.    UA negative for UTI. +ketones.    BG is improved with insulin and fluids. Will dc patient with metformin 500mg BID. Follow up with PCP in 3-5 days.                Patient Care Considerations:    CT ABDOMEN AND PELVIS: I considered ordering a CT scan of the abdomen and pelvis however abd is soft and non-tender in all quadrants.      Consultants/Shared Management Plan:    None    Social Determinants of Health:    Patient is independent, reliable, and has access to care.       Disposition and Care Coordination:    Discharged: The patient is suitable and stable for discharge with no need for consideration of admission.    I have explained the patient´s condition, diagnoses and treatment plan based on the information available to me at this time. I have answered questions and addressed any concerns. The patient has a good  understanding of the patient´s diagnosis, condition, and treatment plan as can be expected at this point. The vital signs have been stable. The patient´s condition is stable and appropriate for discharge from the emergency department.      The patient will " pursue further outpatient evaluation with the primary care physician or other designated or consulting physician as outlined in the discharge instructions. They are agreeable to this plan of care and follow-up instructions have been explained in detail. The patient has received these instructions in written format and has expressed an understanding of the discharge instructions. The patient is aware that any significant change in condition or worsening of symptoms should prompt an immediate return to this or the closest emergency department or call to 911.  I have explained discharge medications and the need for follow up with the patient/caretakers. This was also printed in the discharge instructions. Patient was discharged with the following medications and follow up:      Medication List        New Prescriptions      metFORMIN 500 MG tablet  Commonly known as: GLUCOPHAGE  Take 1 tablet by mouth 2 (Two) Times a Day With Meals.               Where to Get Your Medications        These medications were sent to [facility] [address] [place] [address], [place] [address] [facility] [phone] [facility] [phone] [address] [facility] [address]      Phone: [phone]   metFORMIN 500 MG tablet      No follow-up provider specified.     Final diagnoses:   Type 2 diabetes mellitus without complication, without long-term current use of insulin        ED Disposition       ED Disposition   Discharge    Condition   Stable    Comment   --               This medical record created using voice recognition software.             Bernard Mccauley PA  04/25/25 8937

## 2025-04-28 ENCOUNTER — TELEPHONE (OUTPATIENT)
Dept: PRIMARY CARE | Facility: CLINIC | Age: 70
End: 2025-04-28
Payer: MEDICARE

## 2025-04-28 NOTE — TELEPHONE ENCOUNTER
Pt was taken to ED in [place] for sugar issues, pt was diagnosed with type 2 diabetes & was given metformin. Pt is still having numbers in the upper 400's even on the medication. Pts wife wants to know if you want her to schedule him an appointment.     Pts wife #[phone]    TY

## 2025-05-01 ENCOUNTER — APPOINTMENT (OUTPATIENT)
Dept: PRIMARY CARE | Facility: CLINIC | Age: 70
End: 2025-05-01
Payer: MEDICARE

## 2025-05-06 ENCOUNTER — APPOINTMENT (OUTPATIENT)
Dept: PRIMARY CARE | Facility: CLINIC | Age: 70
End: 2025-05-06
Payer: MEDICARE

## 2025-05-06 VITALS
OXYGEN SATURATION: 95 % | DIASTOLIC BLOOD PRESSURE: 74 MMHG | BODY MASS INDEX: 30.1 KG/M2 | WEIGHT: 222.2 LBS | HEIGHT: 72 IN | HEART RATE: 62 BPM | TEMPERATURE: 98 F | SYSTOLIC BLOOD PRESSURE: 149 MMHG

## 2025-05-06 DIAGNOSIS — I74.09 AORTOILIAC OCCLUSIVE DISEASE (MULTI): ICD-10-CM

## 2025-05-06 DIAGNOSIS — I10 BENIGN ESSENTIAL HYPERTENSION: ICD-10-CM

## 2025-05-06 DIAGNOSIS — J42 CHRONIC BRONCHITIS, UNSPECIFIED CHRONIC BRONCHITIS TYPE (MULTI): ICD-10-CM

## 2025-05-06 DIAGNOSIS — E11.9 DIABETES MELLITUS WITHOUT COMPLICATION: Primary | ICD-10-CM

## 2025-05-06 DIAGNOSIS — Z95.2 S/P AORTIC VALVE REPLACEMENT: ICD-10-CM

## 2025-05-06 DIAGNOSIS — R56.9 SEIZURE (MULTI): ICD-10-CM

## 2025-05-06 DIAGNOSIS — I70.245 ATHEROSCLEROSIS OF NATIVE ARTERIES OF LEFT LEG WITH ULCERATION OF OTHER PART OF FOOT (MULTI): ICD-10-CM

## 2025-05-06 PROCEDURE — 3078F DIAST BP <80 MM HG: CPT | Performed by: FAMILY MEDICINE

## 2025-05-06 PROCEDURE — G2211 COMPLEX E/M VISIT ADD ON: HCPCS | Performed by: FAMILY MEDICINE

## 2025-05-06 PROCEDURE — 1036F TOBACCO NON-USER: CPT | Performed by: FAMILY MEDICINE

## 2025-05-06 PROCEDURE — 1159F MED LIST DOCD IN RCRD: CPT | Performed by: FAMILY MEDICINE

## 2025-05-06 PROCEDURE — 3008F BODY MASS INDEX DOCD: CPT | Performed by: FAMILY MEDICINE

## 2025-05-06 PROCEDURE — 1160F RVW MEDS BY RX/DR IN RCRD: CPT | Performed by: FAMILY MEDICINE

## 2025-05-06 PROCEDURE — 3077F SYST BP >= 140 MM HG: CPT | Performed by: FAMILY MEDICINE

## 2025-05-06 PROCEDURE — 99214 OFFICE O/P EST MOD 30 MIN: CPT | Performed by: FAMILY MEDICINE

## 2025-05-06 RX ORDER — METFORMIN HYDROCHLORIDE 500 MG/1
500 TABLET ORAL
COMMUNITY
Start: 2025-04-26 | End: 2025-05-09 | Stop reason: SDUPTHER

## 2025-05-06 ASSESSMENT — ENCOUNTER SYMPTOMS
ENDOCRINE NEGATIVE: 1
NEUROLOGICAL NEGATIVE: 1
RESPIRATORY NEGATIVE: 1
CONSTITUTIONAL NEGATIVE: 1

## 2025-05-06 NOTE — PROGRESS NOTES
Subjective   Patient ID: Jean Claude Viramontes is a 69 y.o. male who presents for misc (Discuss diabetes).    HPI     Review of Systems   Constitutional: Negative.    HENT: Negative.     Eyes:  Positive for visual disturbance.   Respiratory: Negative.     Endocrine: Negative.         Patient was seen in the emergency room with a 515 blood sugar and a markedly elevated hemoglobin A1c.  Started on metformin 500 mg twice a day   Genitourinary: Negative.    Neurological: Negative.         Dr Sams       Objective   /74 (BP Location: Left arm)   Pulse 62   Temp 36.7 °C (98 °F)   Ht 1.829 m (6')   Wt 101 kg (222 lb 3.2 oz)   SpO2 95%   BMI 30.14 kg/m²     Physical Exam  Vitals and nursing note reviewed.   Constitutional:       Appearance: Normal appearance.   Cardiovascular:      Rate and Rhythm: Regular rhythm.      Heart sounds: Murmur heard.      Systolic murmur is present with a grade of 1/6.   Pulmonary:      Breath sounds: Normal breath sounds.   Abdominal:      Palpations: Abdomen is soft.   Neurological:      General: No focal deficit present.      Mental Status: He is alert and oriented to person, place, and time.   Psychiatric:         Mood and Affect: Mood normal.         Behavior: Behavior normal.         Assessment/Plan patient seen here to follow-up after having been diagnosed with new onset diabetes mellitus.  I added Jardiance to his metformin since his blood sugars are still running in the 300s.  He does have an upcoming appointment with optometry.  I like to see him in 2 to 3 months to recheck his lab work.  He he also is following up with his nutritional counseling  Problem List Items Addressed This Visit           ICD-10-CM    S/P aortic valve replacement Z95.2    Benign essential hypertension I10    Chronic bronchitis, unspecified chronic bronchitis type (Multi) J42    Diabetes mellitus without complication - Primary E11.9    Relevant Medications    empagliflozin (Jardiance) 25 mg tablet     Atherosclerosis of native arteries of left leg with ulceration of other part of foot (Multi) I70.245    Aortoiliac occlusive disease (Multi) I74.09    Seizure (Multi) R56.9

## 2025-05-07 ENCOUNTER — TELEPHONE (OUTPATIENT)
Dept: PRIMARY CARE | Facility: CLINIC | Age: 70
End: 2025-05-07
Payer: MEDICARE

## 2025-05-07 DIAGNOSIS — E11.9 DIABETES MELLITUS WITHOUT COMPLICATION: Primary | ICD-10-CM

## 2025-05-07 RX ORDER — GLIMEPIRIDE 4 MG/1
4 TABLET ORAL
Qty: 100 TABLET | Refills: 3 | Status: SHIPPED | OUTPATIENT
Start: 2025-05-07 | End: 2026-06-11

## 2025-05-07 NOTE — TELEPHONE ENCOUNTER
Pt called and said that empagliflozin was too expensive for them to cover even with insurance. Pt was told to call and tell you if they couldn't cover it & you would RX something new.     [facility] - [phone]      Pts #[phone]    TY

## 2025-05-09 ENCOUNTER — TELEPHONE (OUTPATIENT)
Dept: PRIMARY CARE | Facility: CLINIC | Age: 70
End: 2025-05-09
Payer: MEDICARE

## 2025-05-09 DIAGNOSIS — E11.9 DIABETES MELLITUS WITHOUT COMPLICATION: Primary | ICD-10-CM

## 2025-05-09 RX ORDER — METFORMIN HYDROCHLORIDE 500 MG/1
500 TABLET ORAL
Qty: 60 TABLET | Refills: 0 | Status: SHIPPED | OUTPATIENT
Start: 2025-05-09

## 2025-05-09 NOTE — TELEPHONE ENCOUNTER
Rx Refill Request Telephone Encounter    Name:  Jean Claude ROY Ana M  :  [id number]  Medication Name:  metFORMIN (Glucophage) 500 mg tablet   Specific Pharmacy location:  [facility], [place]   Date of last appointment:  25    Date of next appointment:  25   Best number to reach patient:  [phone]

## 2025-05-12 RX ORDER — METFORMIN HYDROCHLORIDE 500 MG/1
500 TABLET ORAL
Qty: 60 TABLET | Refills: 0 | Status: SHIPPED | OUTPATIENT
Start: 2025-05-12

## 2025-05-14 ENCOUNTER — CLINICAL SUPPORT (OUTPATIENT)
Dept: CARDIOLOGY | Facility: HOSPITAL | Age: 70
End: 2025-05-14
Payer: MEDICARE

## 2025-05-14 DIAGNOSIS — Z95.2 S/P AORTIC VALVE REPLACEMENT: ICD-10-CM

## 2025-05-14 DIAGNOSIS — E11.9 DIABETES MELLITUS WITHOUT COMPLICATION: ICD-10-CM

## 2025-05-14 DIAGNOSIS — I73.9 PERIPHERAL VASCULAR DISEASE: ICD-10-CM

## 2025-05-14 PROCEDURE — 97802 MEDICAL NUTRITION INDIV IN: CPT | Performed by: INTERNAL MEDICINE

## 2025-05-14 PROCEDURE — 99211 OFF/OP EST MAY X REQ PHY/QHP: CPT

## 2025-05-14 NOTE — PROGRESS NOTES
Assessment:  Pt is a 69 y.o. male with a past medical history of hypertension, diabetes without complications, S/P aortic valve repair referred for initial nutrition assessment. Pt is here today with his wife to learn more about how to eat to get his blood sugars under better control. Pt's spouse reports he went to the ER about a month ago when at their daughter's in [place]. Reported his blood sugar was 515 at the ER. He has started taking glimepiride once a day, metformin twice a day. Reports his blood sugar this morning was 199. His wife reports she has been researching diabetic diets and reading labels for sugar, sodium and carb content. He is currently testing his blood sugars in the morning (fasting) and before dinner. Reports he is interested in a continuous glucose monitor.     Lab Results   Component Value Date    HGBA1C 6.9 (H) 04/18/2024    HGBA1C 5.3 12/20/2018    CHOL 136 10/17/2023    LDLF 85 07/01/2021    TRIG 250 (H) 10/17/2023    HDL 29.5 10/17/2023      Typical Intake:  Breakfast: Eggs - 2, turkey sausage (eggs 4x/week), work days - [employer] - egg sandwich. Pancakes with butter 1x/week. Water, coffee - with powdered cream     Lunch: [employer] brats - no bread with mustard -(eats multiple times per week), [employer] fish sandwich 2x/week; coffee if cold out, mixes unsweetened tea with zero sugar lemonade.    Dinner: salmon 1-2x/week, cottage cheese, asparagus; turkey burger on whole wheat bun with carrots, chicken breast grilled; rice with shrimp, peppers, onions   Snack: no sugar lemon cake, coffee, milk 1%  Beverages: coffee with powdered creamer, water, 10 oz 1% milk at dinner.    Meal preparation: spouse prepares most dinners - baking, grilling, sautee in olive oil or vegetable oil; pt makes his own lunches - likes fast, convenient foods to make in the microwave.  Dining out/take out/fast food: 4x/week  Reading labels: sugars, sodium, carbs    Allergies: None  Intolerance: None  Appetite:  Good  Intake: >75%  GI Symptoms : diarrhea Frequency: monthly  Swallowing Difficulty: No problems with swallowing  Dentition : lower denture/partial and missing upper back teeth.    Vitamins/minerals/supplements: Nervive    Exercise: no organized exercise, active - does yard work, golfs (rides in the cart), daily chores, walks dog nightly about 1/4 mile. Reports he does not have a lot of stamina for exercise.     Diagnosis:   Diagnosis Statement 1:  Diagnosis Status: New  Diagnosis : Food and nutrition related knowledge deficit related to lack of or limited prior nutrition-related education as evidenced by pt and spouse report wanting to  to learn more about how to eat to get his blood sugars under better control.    Intervention:  MNT for diabetes, Mediterranean diet    Educational Materials provided: Diabetic plate, label, Mediterranean Pyramid, heart healthy tips    Use the diabetic plate to promote balance and portion control. Reviewed portion sizes of foods. Reviewed how the plate can help with better blood sugar management. Choose whole grains like brown rice, quinoa, barley, and whole grain bread, pasta, cereal. Choose lean meats - chicken, turkey, fish, and occasional lean cuts of red meat. Discussed non-starchy and starchy vegetables and where they fit on the plate. Discussed how lean proteins and healthy fats can help with better blood sugar control.   Increase fiber. Reviewed goal of fiber intake at 30g/day. Discussed the overall benefits of fiber and how fiber can help with better blood sugar control. Reviewed sources of fiber.   Follow Mediterranean Diet. Reviewed guidelines with pt. Discussed choosing healthy fats (oils, nuts, seeds, avocado, fatty fish) over unhealthy fats (butter, poultry skins, red meats, whole dairy products). Discussed the benefit of following a heart healthy diet for diabetic patients.   Limit sodium to 6595-5941 mg/day. Limit processed, packaged, prepared and fast foods.   Meal  prep for lunches. Discussed healthy meal ideas to prep so patient could still have fast, convenient meals that were healthy.  Test blood sugars 2 hours after meals. Goal <180 mg/dL.         Monitoring and Evaluation:  Will monitor weight trend, intake, labs, and pt progress.     Jony Mcduffie, RDN, LD

## 2025-05-21 ENCOUNTER — APPOINTMENT (OUTPATIENT)
Dept: CARDIAC SURGERY | Facility: HOSPITAL | Age: 70
End: 2025-05-21
Payer: MEDICARE

## 2025-05-22 ENCOUNTER — APPOINTMENT (OUTPATIENT)
Dept: PRIMARY CARE | Facility: CLINIC | Age: 70
End: 2025-05-22
Payer: MEDICARE

## 2025-05-28 ENCOUNTER — APPOINTMENT (OUTPATIENT)
Dept: NEUROLOGY | Facility: CLINIC | Age: 70
End: 2025-05-28
Payer: MEDICARE

## 2025-05-28 ENCOUNTER — APPOINTMENT (OUTPATIENT)
Dept: CARDIAC SURGERY | Facility: HOSPITAL | Age: 70
End: 2025-05-28
Payer: MEDICARE

## 2025-05-29 PROBLEM — E66.811 CLASS 1 OBESITY WITH BODY MASS INDEX (BMI) OF 30.0 TO 30.9 IN ADULT: Status: ACTIVE | Noted: 2025-05-29

## 2025-06-02 DIAGNOSIS — I10 HYPERTENSION, UNSPECIFIED TYPE: ICD-10-CM

## 2025-06-02 RX ORDER — HYDROCHLOROTHIAZIDE 25 MG/1
25 TABLET ORAL DAILY
Qty: 90 TABLET | Refills: 0 | Status: SHIPPED | OUTPATIENT
Start: 2025-06-02

## 2025-06-04 ENCOUNTER — HOSPITAL ENCOUNTER (OUTPATIENT)
Dept: CARDIOLOGY | Facility: HOSPITAL | Age: 70
Discharge: HOME | End: 2025-06-04
Payer: MEDICARE

## 2025-06-04 DIAGNOSIS — Z95.2 STATUS POST AORTIC VALVE REPLACEMENT: ICD-10-CM

## 2025-06-04 PROCEDURE — 93306 TTE W/DOPPLER COMPLETE: CPT | Performed by: INTERNAL MEDICINE

## 2025-06-04 PROCEDURE — 93306 TTE W/DOPPLER COMPLETE: CPT

## 2025-06-05 LAB
AORTIC VALVE MEAN GRADIENT: 11 MMHG
AORTIC VALVE PEAK VELOCITY: 2.49 M/S
AV PEAK GRADIENT: 25 MMHG
AVA (PEAK VEL): 1.69 CM2
AVA (VTI): 1.73 CM2
EJECTION FRACTION: 63 %
LEFT ATRIUM VOLUME AREA LENGTH INDEX BSA: 22.5 ML/M2
LEFT VENTRICLE INTERNAL DIMENSION DIASTOLE: 3.66 CM (ref 3.5–6)
LEFT VENTRICULAR OUTFLOW TRACT DIAMETER: 2.27 CM
MITRAL VALVE E/A RATIO: 1.08
RIGHT VENTRICLE FREE WALL PEAK S': 10 CM/S
RIGHT VENTRICLE PEAK SYSTOLIC PRESSURE: 20 MMHG

## 2025-06-11 ENCOUNTER — OFFICE VISIT (OUTPATIENT)
Dept: CARDIAC SURGERY | Facility: HOSPITAL | Age: 70
End: 2025-06-11
Payer: MEDICARE

## 2025-06-11 VITALS
BODY MASS INDEX: 30.34 KG/M2 | OXYGEN SATURATION: 98 % | HEART RATE: 61 BPM | WEIGHT: 224 LBS | SYSTOLIC BLOOD PRESSURE: 137 MMHG | DIASTOLIC BLOOD PRESSURE: 71 MMHG | HEIGHT: 72 IN

## 2025-06-11 DIAGNOSIS — Z95.828 S/P ASCENDING AORTIC REPLACEMENT: Primary | ICD-10-CM

## 2025-06-11 DIAGNOSIS — Z95.2 S/P AORTIC VALVE REPLACEMENT: ICD-10-CM

## 2025-06-11 PROCEDURE — 3078F DIAST BP <80 MM HG: CPT

## 2025-06-11 PROCEDURE — 3075F SYST BP GE 130 - 139MM HG: CPT

## 2025-06-11 PROCEDURE — 99215 OFFICE O/P EST HI 40 MIN: CPT

## 2025-06-11 PROCEDURE — 99212 OFFICE O/P EST SF 10 MIN: CPT

## 2025-06-11 PROCEDURE — 1036F TOBACCO NON-USER: CPT

## 2025-06-11 PROCEDURE — 3008F BODY MASS INDEX DOCD: CPT

## 2025-06-11 PROCEDURE — 1159F MED LIST DOCD IN RCRD: CPT

## 2025-06-11 PROCEDURE — 1126F AMNT PAIN NOTED NONE PRSNT: CPT

## 2025-06-11 ASSESSMENT — ENCOUNTER SYMPTOMS
CHILLS: 0
COUGH: 0
DECREASED APPETITE: 0
FEVER: 0
SPUTUM PRODUCTION: 0

## 2025-06-11 ASSESSMENT — PAIN SCALES - GENERAL: PAINLEVEL_OUTOF10: 0-NO PAIN

## 2025-06-11 NOTE — PROGRESS NOTES
Subjective   Jean Claude Viramontes is a 69 y.o. male.    Chief Complaint:  Follow-up    HPI  Mr. Jean Claude Viramontes is a 69-year-old male,  presents today for annual follow-up visit.he previously underwent a standard median sternotomy, aortic valve replacement (AVR) and ascending aorta replacement with 32 mm Gelweave graft on August 26, 2021 with Dr. Mishra.  He is accompanied by his wife.      The patient reports overall good health but mentions a recent diagnosis of type 2 diabetes, for which he has started treatment with metformin and glimepiride. He continues to maintain an active lifestyle.  He denies chest pain, shortness of breath, lower extremity edema, palpitations, or syncope.     A transthoracic echocardiogram was performed on June 4, 2025, and the results will be reviewed today    Review of Systems   Constitutional: Negative for chills, decreased appetite and fever.   Respiratory:  Negative for cough and sputum production.        Objective   Constitutional:       Appearance: Normal and healthy appearance.   Cardiovascular:      Normal rate. Regular rhythm.   Psychiatric:         Attention and Perception: Attention normal.         Mood and Affect: Mood normal.         Behavior: Behavior is cooperative.         Judgment: Judgment normal.       Assessment/Plan   In summary, transthoracic echocardiogram demonstrates a normally functioning aortic valve with no regurgitation.  The peak and mean gradients across the valve are 24.8 mmHg and 11.5 mmHg, respectively.  The aortic root shows postsurgical changes but no significant progression compared to last year's echo, which showed a peak gradient of 20.1 mmHg and mean gradient of 12 mmHg.    The patient will continue routine surveillance with a repeat transthoracic echocardiogram  in 1 year.   CT scan will be with vascular surgery to evaluate the ascending and descending aorta at that time.    Educated patient that hypertension is the leading cause of aneurysmal growth and  the importance of maintaining a normal blood pressure.    Office will contact patient to set up visit for next year.    Time: 40 minutes

## 2025-06-25 ENCOUNTER — APPOINTMENT (OUTPATIENT)
Dept: CARDIOLOGY | Facility: CLINIC | Age: 70
End: 2025-06-25
Payer: MEDICARE

## 2025-06-25 VITALS
OXYGEN SATURATION: 95 % | WEIGHT: 222 LBS | SYSTOLIC BLOOD PRESSURE: 122 MMHG | DIASTOLIC BLOOD PRESSURE: 70 MMHG | HEART RATE: 62 BPM | BODY MASS INDEX: 30.11 KG/M2

## 2025-06-25 DIAGNOSIS — I10 BENIGN ESSENTIAL HYPERTENSION: ICD-10-CM

## 2025-06-25 DIAGNOSIS — I71.20 THORACIC AORTIC ANEURYSM WITHOUT RUPTURE, UNSPECIFIED PART: Primary | ICD-10-CM

## 2025-06-25 DIAGNOSIS — R06.02 SHORTNESS OF BREATH: ICD-10-CM

## 2025-06-25 DIAGNOSIS — Z71.89 CARDIAC RISK COUNSELING: ICD-10-CM

## 2025-06-25 DIAGNOSIS — I73.9 PERIPHERAL VASCULAR DISEASE: ICD-10-CM

## 2025-06-25 DIAGNOSIS — Z95.2 S/P AORTIC VALVE REPLACEMENT: ICD-10-CM

## 2025-06-25 DIAGNOSIS — I31.39 PERICARDIAL EFFUSION (HHS-HCC): ICD-10-CM

## 2025-06-25 DIAGNOSIS — E78.00 PURE HYPERCHOLESTEROLEMIA: ICD-10-CM

## 2025-06-25 DIAGNOSIS — Z86.79 HISTORY OF ATRIAL FIBRILLATION: ICD-10-CM

## 2025-06-25 LAB
ATRIAL RATE: 62 BPM
P AXIS: 20 DEGREES
P OFFSET: 175 MS
P ONSET: 124 MS
PR INTERVAL: 198 MS
Q ONSET: 223 MS
QRS COUNT: 10 BEATS
QRS DURATION: 94 MS
QT INTERVAL: 434 MS
QTC CALCULATION(BAZETT): 440 MS
QTC FREDERICIA: 439 MS
R AXIS: 27 DEGREES
T AXIS: 57 DEGREES
T OFFSET: 440 MS
VENTRICULAR RATE: 62 BPM

## 2025-06-25 PROCEDURE — 1036F TOBACCO NON-USER: CPT | Performed by: INTERNAL MEDICINE

## 2025-06-25 PROCEDURE — 1159F MED LIST DOCD IN RCRD: CPT | Performed by: INTERNAL MEDICINE

## 2025-06-25 PROCEDURE — 99212 OFFICE O/P EST SF 10 MIN: CPT

## 2025-06-25 PROCEDURE — 99214 OFFICE O/P EST MOD 30 MIN: CPT | Performed by: INTERNAL MEDICINE

## 2025-06-25 PROCEDURE — 3074F SYST BP LT 130 MM HG: CPT | Performed by: INTERNAL MEDICINE

## 2025-06-25 PROCEDURE — 3078F DIAST BP <80 MM HG: CPT | Performed by: INTERNAL MEDICINE

## 2025-06-25 PROCEDURE — G2211 COMPLEX E/M VISIT ADD ON: HCPCS | Performed by: INTERNAL MEDICINE

## 2025-06-25 PROCEDURE — 93005 ELECTROCARDIOGRAM TRACING: CPT | Performed by: INTERNAL MEDICINE

## 2025-06-25 NOTE — PROGRESS NOTES
Ear Tubes: How to Care for Your Child After Surgery  Ear tubes placed in the eardrum can create an opening into the middle ear (the space behind the eardrum) so fluid and pressure won't build up. They help kids get fewer ear infections and can sometimes help with hearing loss. Kids heal quickly after ear tube surgery, but some may have ear drainage, pain, or popping for a few days. Use these instructions to care for your child while they recover.      At home, your child can eat a regular diet.  Give your child plenty of fluids to drink.  Let your child rest as needed.  Have your child take it easy on the day of surgery. They can go back to regular activities the day after surgery.  Follow the surgeon's recommendations for:  giving ear drops  giving medicine for pain  whether your child should use ear plugs when bathing or swimming  when to follow up to make sure the ear tubes are draining  whether to schedule a hearing test  If your child has drainage coming out of the ears, place a clean cotton ball in the opening of the ear. Do not use a cotton swab (Q-tip®) inside the ear.  If your child needs to blow their nose, tell them to do so gently.  Your child can travel on airplanes.  Avoid getting dirty water in your child's ear  Bath water  [place] water  [place] water  Clean water is ok to get in your child's ears.   Tap water  Shower water  Pool water  Follow up with Pediatric ENT (either NP or MD) in 6-8 weeks. Called [phone] schedule. With a hearing test unless otherwise stated.     Your child has:  vomiting   a fever  ear pain or drainage for more than a week after surgery  blood-tinged or yellowish-green ear drainage, but please go ahead and start the ear drops  a bad smell coming from the ear  an ear tube that falls out    You notice more than a teaspoon of blood in the ear drainage.  Your child develops severe ear pain.    Expected Post-Surgical Symptoms       Ear Drainage after Surgery: Because an opening  Chief Complaint:   Annual Exam, Hypertension, Peripheral Vascular Disease, and Atrial Fibrillation     History Of Present Illness:    Jean Claude Viramontes is a 69 y.o. male presenting with cardiovascular disease.    Some ENCISO-no associated cough /wheeze  Patient denies chest pain/palpitations/dizziness/lightheadedness/edema/claudication    No regular exercise but some golfing/bike riding       Last Recorded Vitals:  Vitals:    06/25/25 0930 06/25/25 1001   BP: 132/80 122/70   BP Location: Right arm    Patient Position: Sitting    BP Cuff Size: Large adult    Pulse: 62    SpO2: 95%    Weight: 101 kg (222 lb)             Allergies:  Phenytoin sodium extended and Phenytoin    Outpatient Medications:  Current Outpatient Medications   Medication Instructions    aspirin 81 mg EC tablet 1 tablet, Daily    atorvastatin (LIPITOR) 10 mg, oral, Daily    glimepiride (AMARYL) 4 mg, oral, Daily before breakfast    hydroCHLOROthiazide (HYDRODIURIL) 25 mg, oral, Daily    metFORMIN (GLUCOPHAGE) 500 mg, oral, 2 times daily (morning and late afternoon)    metoprolol tartrate (LOPRESSOR) 50 mg, oral, 2 times daily    OXcarbazepine (TRILEPTAL) 600 mg, oral, 2 times daily    sildenafil (VIAGRA) 100 mg, oral, Daily PRN       Physical Exam:  Constitutional:       Appearance: Healthy appearance. Not in distress.   Neck:      Vascular: No JVR. JVD normal.   Pulmonary:      Effort: Pulmonary effort is normal.      Breath sounds: Normal breath sounds. No wheezing. No rhonchi. No rales.   Chest:      Chest wall: Not tender to palpatation.   Cardiovascular:      PMI at left midclavicular line. Normal rate. Regular rhythm. Normal S1. Normal S2.       Murmurs: There is a grade 2/6 crescendo-decrescendo midsystolic murmur at the URSB.      No gallop.  No click. No rub.   Pulses:     Intact distal pulses.   Edema:     Peripheral edema absent.   Abdominal:      General: Bowel sounds are normal.      Palpations: Abdomen is soft.      Tenderness: There is no  abdominal tenderness.   Musculoskeletal: Normal range of motion.         General: No tenderness. Skin:     General: Skin is warm and dry.   Neurological:      General: No focal deficit present.      Mental Status: Alert and oriented to person, place and time.          Last Labs:  CBC -  Lab Results   Component Value Date    WBC 7.9 10/17/2023    HGB 16.0 10/17/2023    HCT 47.3 10/17/2023    MCV 96 10/17/2023     10/17/2023       CMP -  Lab Results   Component Value Date    CALCIUM 8.8 04/25/2024    PHOS 4.6 09/09/2021    PROT 7.7 10/17/2023    ALBUMIN 4.3 10/17/2023    AST 18 10/17/2023    ALT 22 10/17/2023    ALKPHOS 72 10/17/2023    BILITOT 0.6 10/17/2023       LIPID PANEL -   Lab Results   Component Value Date    CHOL 136 10/17/2023    TRIG 250 (H) 10/17/2023    HDL 29.5 10/17/2023    CHHDL 4.6 10/17/2023    LDLF 85 07/01/2021    VLDL 50 (H) 10/17/2023   Ldl=57    RENAL FUNCTION PANEL -   Lab Results   Component Value Date    GLUCOSE 163 (H) 04/25/2024     04/25/2024    K 4.0 04/25/2024     04/25/2024    CO2 26 04/25/2024    ANIONGAP 12 04/25/2024    BUN 18 04/25/2024    CREATININE 0.91 04/25/2024    GFRMALE >90 04/27/2023    CALCIUM 8.8 04/25/2024    PHOS 4.6 09/09/2021    ALBUMIN 4.3 10/17/2023      4/2025  Creatinine 1  Potassium 4.5    Lab Results   Component Value Date     (H) 09/03/2021    HGBA1C 6.9 (H) 04/18/2024    HGBA1C 5.3 12/20/2018                 Lab review: I have personally reviewed the laboratory result(s)       Problem List Items Addressed This Visit       S/P aortic valve replacement    Overview   8/23/2017 27mm Inspiris bioprosthetic valve for AS (note: NL cors on preop cath)   Stable valve function per 5/2024 echo  Stable valve function again per 5/2025 echo             Pericardial effusion (HHS-HCC)    Overview    S/P 9/5/21 pericardial window  No pericardial effusion or thickening per 5/2024 echo    Trivial effusion per 6/2025 echo         Benign essential  in the eardrum has been made, you may see drainage from the middle ear for 2 to 4 days after the operation. The drainage may be clear pink or bloody. The doctor may give you some medicine drops for this. If the stinging makes your child too uncomfortable, you may stop the drops.   Ear Infections: PE tubes will help stop ear infections most of the time. However, an ear infection can still occur. You should call the office nurse if you have ear pain, fullness in the ears, hearing problems, or drainage or blood from the ears (except just after surgery.)       How long do ear tubes stay in? Ear tubes usually stay in from 6 to 18 months, depending on the type of tube used. They usually fall out on their own, pushed out as the eardrum heals. If a tube stays in the eardrum beyond 2 to 3 years, though, your doctor might choose to remove it.  For any questions call [phone]. After hours call [phone] and ask for the pediatric ENT resident on call.     [email or web address]         © 2022 [employer]®. Used and adapted under license by  [employer]. This information is for general use only. For specific medical advice or questions, consult your health care professional. [id number]    hypertension    Overview   BP stable on current meds  4/2024 BMP okay         Relevant Orders    ECG 12 lead (Clinic Performed)    Thoracic aortic aneurysm (TAA) - Primary    Overview   s/p 8/23/2021 32 mm GelWeave graft  Stable 4/30/2024 chest CT   (5/2025 echo  Follows with Dr. Pollard at [facility] [place] [facility]         Shortness of breath    Overview   Patient with persistent shortness of breath despite undergoing aortic valve replacement for aortic valve disease.  5/2024 echo was unremarkable.  Normal coronary arteries on preop cardiac cath.  No dilatation of the pulmonary arteries on chest CT  Chest CT did show pleural plaques suggestive of asbestosis which may be the etiology of the shortness of breath.    Seeing pulmonary         History of atrial fibrillation    Overview   Patient developed A-fib w/RVR 9/3/2021 in setting of pericardial effusion   Converted to NSR on IV Amiodarone -had course of po amiodarone  In NSR since  On ASA / beta blocker   Follow           Cardiac risk counseling    Overview   10/2023 total cholesterol close 136 with an LDL cholesterol equals 57  Triglycerides were elevated at 250-patient is scheduled to see his primary care doctor about elevated blood sugars.         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  There is no significant change versus the prior study.  He is being followed by vascular surgery.              Stay active-walk 30 minutes most days  Weight loss  Lipids  Keegan Conley DO

## 2025-07-04 DIAGNOSIS — E11.9 DIABETES MELLITUS WITHOUT COMPLICATION: ICD-10-CM

## 2025-07-07 DIAGNOSIS — E11.9 DIABETES MELLITUS WITHOUT COMPLICATION: ICD-10-CM

## 2025-07-07 RX ORDER — METFORMIN HYDROCHLORIDE 500 MG/1
TABLET ORAL
Qty: 60 TABLET | Refills: 0 | Status: SHIPPED | OUTPATIENT
Start: 2025-07-07

## 2025-07-07 RX ORDER — METFORMIN HYDROCHLORIDE 500 MG/1
500 TABLET ORAL
Qty: 60 TABLET | Refills: 0 | Status: SHIPPED | OUTPATIENT
Start: 2025-07-07

## 2025-07-17 ENCOUNTER — APPOINTMENT (OUTPATIENT)
Dept: PRIMARY CARE | Facility: CLINIC | Age: 70
End: 2025-07-17
Payer: MEDICARE

## 2025-07-17 VITALS
DIASTOLIC BLOOD PRESSURE: 68 MMHG | OXYGEN SATURATION: 95 % | WEIGHT: 223.2 LBS | HEART RATE: 57 BPM | TEMPERATURE: 98.1 F | BODY MASS INDEX: 30.23 KG/M2 | SYSTOLIC BLOOD PRESSURE: 129 MMHG | HEIGHT: 72 IN

## 2025-07-17 DIAGNOSIS — R56.9 SEIZURE (MULTI): ICD-10-CM

## 2025-07-17 DIAGNOSIS — E66.09 CLASS 1 OBESITY DUE TO EXCESS CALORIES WITH SERIOUS COMORBIDITY AND BODY MASS INDEX (BMI) OF 30.0 TO 30.9 IN ADULT: ICD-10-CM

## 2025-07-17 DIAGNOSIS — I48.91 ATRIAL FIBRILLATION, UNSPECIFIED TYPE (MULTI): ICD-10-CM

## 2025-07-17 DIAGNOSIS — I10 BENIGN ESSENTIAL HYPERTENSION: ICD-10-CM

## 2025-07-17 DIAGNOSIS — Z71.89 CARDIAC RISK COUNSELING: ICD-10-CM

## 2025-07-17 DIAGNOSIS — E66.811 CLASS 1 OBESITY DUE TO EXCESS CALORIES WITH SERIOUS COMORBIDITY AND BODY MASS INDEX (BMI) OF 30.0 TO 30.9 IN ADULT: ICD-10-CM

## 2025-07-17 DIAGNOSIS — Z95.2 S/P AORTIC VALVE REPLACEMENT: ICD-10-CM

## 2025-07-17 DIAGNOSIS — E11.9 DIABETES MELLITUS WITHOUT COMPLICATION: Primary | ICD-10-CM

## 2025-07-17 LAB — HBA1C MFR BLD: 6 % (ref 4.2–6.5)

## 2025-07-17 PROCEDURE — 3074F SYST BP LT 130 MM HG: CPT | Performed by: FAMILY MEDICINE

## 2025-07-17 PROCEDURE — 99214 OFFICE O/P EST MOD 30 MIN: CPT | Performed by: FAMILY MEDICINE

## 2025-07-17 PROCEDURE — G2211 COMPLEX E/M VISIT ADD ON: HCPCS | Performed by: FAMILY MEDICINE

## 2025-07-17 PROCEDURE — 1126F AMNT PAIN NOTED NONE PRSNT: CPT | Performed by: FAMILY MEDICINE

## 2025-07-17 PROCEDURE — 3078F DIAST BP <80 MM HG: CPT | Performed by: FAMILY MEDICINE

## 2025-07-17 PROCEDURE — 1159F MED LIST DOCD IN RCRD: CPT | Performed by: FAMILY MEDICINE

## 2025-07-17 PROCEDURE — 83036 HEMOGLOBIN GLYCOSYLATED A1C: CPT | Mod: CLIA WAIVED TEST | Performed by: FAMILY MEDICINE

## 2025-07-17 PROCEDURE — 3008F BODY MASS INDEX DOCD: CPT | Performed by: FAMILY MEDICINE

## 2025-07-17 PROCEDURE — 1160F RVW MEDS BY RX/DR IN RCRD: CPT | Performed by: FAMILY MEDICINE

## 2025-07-17 RX ORDER — OXCARBAZEPINE 600 MG/1
600 TABLET, FILM COATED ORAL 2 TIMES DAILY
Qty: 180 TABLET | Refills: 0 | Status: SHIPPED | OUTPATIENT
Start: 2025-07-17

## 2025-07-17 RX ORDER — METFORMIN HYDROCHLORIDE 500 MG/1
500 TABLET, EXTENDED RELEASE ORAL
Qty: 180 TABLET | Refills: 3 | Status: SHIPPED | OUTPATIENT
Start: 2025-07-17 | End: 2026-07-17

## 2025-07-17 RX ORDER — METOPROLOL TARTRATE 50 MG/1
50 TABLET ORAL 2 TIMES DAILY
Qty: 180 TABLET | Refills: 3 | Status: SHIPPED | OUTPATIENT
Start: 2025-07-17

## 2025-07-17 RX ORDER — ATORVASTATIN CALCIUM 10 MG/1
10 TABLET, FILM COATED ORAL DAILY
Qty: 90 TABLET | Refills: 3 | Status: SHIPPED | OUTPATIENT
Start: 2025-07-17

## 2025-07-17 ASSESSMENT — ENCOUNTER SYMPTOMS
RESPIRATORY NEGATIVE: 1
ENDOCRINE COMMENTS: DM
CARDIOVASCULAR NEGATIVE: 1
GASTROINTESTINAL NEGATIVE: 1
ENDOCRINE NEGATIVE: 1
CONSTITUTIONAL NEGATIVE: 1
PSYCHIATRIC NEGATIVE: 1
DEPRESSION: 0
LOSS OF SENSATION IN FEET: 0
OCCASIONAL FEELINGS OF UNSTEADINESS: 0
MUSCULOSKELETAL NEGATIVE: 1
SEIZURES: 1

## 2025-07-17 ASSESSMENT — PATIENT HEALTH QUESTIONNAIRE - PHQ9
2. FEELING DOWN, DEPRESSED OR HOPELESS: NOT AT ALL
SUM OF ALL RESPONSES TO PHQ9 QUESTIONS 1 AND 2: 0
1. LITTLE INTEREST OR PLEASURE IN DOING THINGS: NOT AT ALL

## 2025-07-17 ASSESSMENT — PAIN SCALES - GENERAL: PAINLEVEL_OUTOF10: 0-NO PAIN

## 2025-07-17 NOTE — PROGRESS NOTES
Subjective   Patient ID: Jean Claude Viramontes is a 69 y.o. male who presents for Follow-up (diabetes).    HPI     Review of Systems   Constitutional: Negative.    Respiratory: Negative.     Cardiovascular: Negative.         DR Conley   Gastrointestinal: Negative.    Endocrine: Negative.         DM   Genitourinary: Negative.    Musculoskeletal: Negative.    Neurological:  Positive for seizures.        Dr Sams   Psychiatric/Behavioral: Negative.         Objective   /68 (BP Location: Left arm)   Pulse 57   Temp 36.7 °C (98.1 °F) (Oral)   Ht 1.829 m (6')   Wt 101 kg (223 lb 3.2 oz)   SpO2 95%   BMI 30.27 kg/m²     Physical Exam  Vitals and nursing note reviewed.   Constitutional:       Appearance: Normal appearance.     Cardiovascular:      Rate and Rhythm: Normal rate and regular rhythm.      Pulses: Normal pulses.      Heart sounds: Normal heart sounds.   Pulmonary:      Breath sounds: Normal breath sounds.     Musculoskeletal:         General: Normal range of motion.   Feet:      Comments: Normal diabetic foot exam    Neurological:      General: No focal deficit present.      Mental Status: He is alert and oriented to person, place, and time.     Psychiatric:         Mood and Affect: Mood normal.         Behavior: Behavior normal.         Assessment/Plan patient seen here for follow-up on his diabetes.  I switched him to metformin XR since he has been having occasional loose stools with the plain metformin.  We are checking his hemoglobin A1c here today along with results today.  Otherwise we will see him back in the spring for a wellness exam  Problem List Items Addressed This Visit           ICD-10-CM    S/P aortic valve replacement Z95.2    Benign essential hypertension I10    Diabetes mellitus without complication - Primary E11.9    Relevant Medications    metFORMIN XR (Glucophage-XR) 500 mg 24 hr tablet    Other Relevant Orders    POCT Glycosylated Hemoglobin (HGB A1C) docked device    Seizure  (Multi) R56.9    Class 1 obesity with body mass index (BMI) of 30.0 to 30.9 in adult E66.811, Z68.30

## 2025-08-25 DIAGNOSIS — I10 HYPERTENSION, UNSPECIFIED TYPE: ICD-10-CM

## 2025-08-25 RX ORDER — HYDROCHLOROTHIAZIDE 25 MG/1
25 TABLET ORAL DAILY
Qty: 90 TABLET | Refills: 0 | Status: SHIPPED | OUTPATIENT
Start: 2025-08-25

## 2026-04-30 ENCOUNTER — APPOINTMENT (OUTPATIENT)
Dept: PRIMARY CARE | Facility: CLINIC | Age: 71
End: 2026-04-30
Payer: MEDICARE